# Patient Record
Sex: FEMALE | NOT HISPANIC OR LATINO | URBAN - METROPOLITAN AREA
[De-identification: names, ages, dates, MRNs, and addresses within clinical notes are randomized per-mention and may not be internally consistent; named-entity substitution may affect disease eponyms.]

---

## 2017-08-07 ENCOUNTER — OUTPATIENT (OUTPATIENT)
Dept: OUTPATIENT SERVICES | Facility: HOSPITAL | Age: 72
LOS: 1 days | Discharge: HOME | End: 2017-08-07

## 2017-08-08 DIAGNOSIS — N39.0 URINARY TRACT INFECTION, SITE NOT SPECIFIED: ICD-10-CM

## 2017-10-04 ENCOUNTER — OUTPATIENT (OUTPATIENT)
Dept: OUTPATIENT SERVICES | Facility: HOSPITAL | Age: 72
LOS: 1 days | Discharge: HOME | End: 2017-10-04

## 2017-10-04 DIAGNOSIS — N39.0 URINARY TRACT INFECTION, SITE NOT SPECIFIED: ICD-10-CM

## 2017-10-09 ENCOUNTER — OUTPATIENT (OUTPATIENT)
Dept: OUTPATIENT SERVICES | Facility: HOSPITAL | Age: 72
LOS: 1 days | Discharge: HOME | End: 2017-10-09

## 2017-10-09 DIAGNOSIS — K59.09 OTHER CONSTIPATION: ICD-10-CM

## 2017-10-09 DIAGNOSIS — G30.9 ALZHEIMER'S DISEASE, UNSPECIFIED: ICD-10-CM

## 2017-10-09 DIAGNOSIS — N39.0 URINARY TRACT INFECTION, SITE NOT SPECIFIED: ICD-10-CM

## 2017-10-27 ENCOUNTER — OUTPATIENT (OUTPATIENT)
Dept: OUTPATIENT SERVICES | Facility: HOSPITAL | Age: 72
LOS: 1 days | Discharge: HOME | End: 2017-10-27

## 2017-10-27 DIAGNOSIS — G30.9 ALZHEIMER'S DISEASE, UNSPECIFIED: ICD-10-CM

## 2017-10-27 DIAGNOSIS — N39.0 URINARY TRACT INFECTION, SITE NOT SPECIFIED: ICD-10-CM

## 2017-10-27 DIAGNOSIS — Z00.00 ENCOUNTER FOR GENERAL ADULT MEDICAL EXAMINATION WITHOUT ABNORMAL FINDINGS: ICD-10-CM

## 2017-12-07 ENCOUNTER — OUTPATIENT (OUTPATIENT)
Dept: OUTPATIENT SERVICES | Facility: HOSPITAL | Age: 72
LOS: 1 days | Discharge: HOME | End: 2017-12-07

## 2017-12-07 DIAGNOSIS — N39.0 URINARY TRACT INFECTION, SITE NOT SPECIFIED: ICD-10-CM

## 2018-01-23 ENCOUNTER — OUTPATIENT (OUTPATIENT)
Dept: OUTPATIENT SERVICES | Facility: HOSPITAL | Age: 73
LOS: 1 days | Discharge: HOME | End: 2018-01-23

## 2018-01-23 DIAGNOSIS — Z00.00 ENCOUNTER FOR GENERAL ADULT MEDICAL EXAMINATION WITHOUT ABNORMAL FINDINGS: ICD-10-CM

## 2018-01-23 DIAGNOSIS — G30.9 ALZHEIMER'S DISEASE, UNSPECIFIED: ICD-10-CM

## 2018-01-23 DIAGNOSIS — K59.09 OTHER CONSTIPATION: ICD-10-CM

## 2018-01-23 DIAGNOSIS — N39.0 URINARY TRACT INFECTION, SITE NOT SPECIFIED: ICD-10-CM

## 2018-03-23 ENCOUNTER — INPATIENT (INPATIENT)
Facility: HOSPITAL | Age: 73
LOS: 7 days | Discharge: NURSING, REHAB OR LONG-TERM CARE | End: 2018-03-31
Attending: INTERNAL MEDICINE

## 2018-03-23 VITALS
HEIGHT: 66 IN | RESPIRATION RATE: 17 BRPM | WEIGHT: 134.92 LBS | TEMPERATURE: 97 F | HEART RATE: 78 BPM | SYSTOLIC BLOOD PRESSURE: 125 MMHG | OXYGEN SATURATION: 97 % | DIASTOLIC BLOOD PRESSURE: 94 MMHG

## 2018-03-23 LAB
ALBUMIN SERPL ELPH-MCNC: 4.2 G/DL — SIGNIFICANT CHANGE UP (ref 3.5–5.2)
ALP SERPL-CCNC: 72 U/L — SIGNIFICANT CHANGE UP (ref 30–115)
ALT FLD-CCNC: 16 U/L — SIGNIFICANT CHANGE UP (ref 0–41)
ANION GAP SERPL CALC-SCNC: 17 MMOL/L — HIGH (ref 7–14)
APTT BLD: 32.2 SEC — SIGNIFICANT CHANGE UP (ref 27–39.2)
AST SERPL-CCNC: 19 U/L — SIGNIFICANT CHANGE UP (ref 0–41)
BASOPHILS # BLD AUTO: 0.03 K/UL — SIGNIFICANT CHANGE UP (ref 0–0.2)
BASOPHILS NFR BLD AUTO: 0.2 % — SIGNIFICANT CHANGE UP (ref 0–1)
BILIRUB SERPL-MCNC: 0.5 MG/DL — SIGNIFICANT CHANGE UP (ref 0.2–1.2)
BUN SERPL-MCNC: 13 MG/DL — SIGNIFICANT CHANGE UP (ref 10–20)
CALCIUM SERPL-MCNC: 9.4 MG/DL — SIGNIFICANT CHANGE UP (ref 8.5–10.1)
CHLORIDE SERPL-SCNC: 95 MMOL/L — LOW (ref 98–110)
CO2 SERPL-SCNC: 28 MMOL/L — SIGNIFICANT CHANGE UP (ref 17–32)
CREAT SERPL-MCNC: 0.7 MG/DL — SIGNIFICANT CHANGE UP (ref 0.7–1.5)
EOSINOPHIL # BLD AUTO: 0.01 K/UL — SIGNIFICANT CHANGE UP (ref 0–0.7)
EOSINOPHIL NFR BLD AUTO: 0.1 % — SIGNIFICANT CHANGE UP (ref 0–8)
GLUCOSE SERPL-MCNC: 134 MG/DL — HIGH (ref 70–99)
HCT VFR BLD CALC: 44.2 % — SIGNIFICANT CHANGE UP (ref 37–47)
HGB BLD-MCNC: 14.7 G/DL — SIGNIFICANT CHANGE UP (ref 12–16)
IMM GRANULOCYTES NFR BLD AUTO: 0.6 % — HIGH (ref 0.1–0.3)
INR BLD: 1.08 RATIO — SIGNIFICANT CHANGE UP (ref 0.65–1.3)
LYMPHOCYTES # BLD AUTO: 0.7 K/UL — LOW (ref 1.2–3.4)
LYMPHOCYTES # BLD AUTO: 3.5 % — LOW (ref 20.5–51.1)
MCHC RBC-ENTMCNC: 31.3 PG — HIGH (ref 27–31)
MCHC RBC-ENTMCNC: 33.3 G/DL — SIGNIFICANT CHANGE UP (ref 32–37)
MCV RBC AUTO: 94 FL — SIGNIFICANT CHANGE UP (ref 81–99)
MONOCYTES # BLD AUTO: 1.07 K/UL — HIGH (ref 0.1–0.6)
MONOCYTES NFR BLD AUTO: 5.4 % — SIGNIFICANT CHANGE UP (ref 1.7–9.3)
NEUTROPHILS # BLD AUTO: 18.03 K/UL — HIGH (ref 1.4–6.5)
NEUTROPHILS NFR BLD AUTO: 90.2 % — HIGH (ref 42.2–75.2)
NRBC # BLD: 0 /100 WBCS — SIGNIFICANT CHANGE UP (ref 0–0)
PLATELET # BLD AUTO: 266 K/UL — SIGNIFICANT CHANGE UP (ref 130–400)
POTASSIUM SERPL-MCNC: 3.9 MMOL/L — SIGNIFICANT CHANGE UP (ref 3.5–5)
POTASSIUM SERPL-SCNC: 3.9 MMOL/L — SIGNIFICANT CHANGE UP (ref 3.5–5)
PROT SERPL-MCNC: 7.2 G/DL — SIGNIFICANT CHANGE UP (ref 6–8)
PROTHROM AB SERPL-ACNC: 11.7 SEC — SIGNIFICANT CHANGE UP (ref 9.95–12.87)
RBC # BLD: 4.7 M/UL — SIGNIFICANT CHANGE UP (ref 4.2–5.4)
RBC # FLD: 12.1 % — SIGNIFICANT CHANGE UP (ref 11.5–14.5)
SODIUM SERPL-SCNC: 140 MMOL/L — SIGNIFICANT CHANGE UP (ref 135–146)
WBC # BLD: 19.95 K/UL — HIGH (ref 4.8–10.8)
WBC # FLD AUTO: 19.95 K/UL — HIGH (ref 4.8–10.8)

## 2018-03-23 RX ORDER — DIVALPROEX SODIUM 500 MG/1
250 TABLET, DELAYED RELEASE ORAL
Qty: 0 | Refills: 0 | Status: DISCONTINUED | OUTPATIENT
Start: 2018-03-23 | End: 2018-03-31

## 2018-03-23 RX ORDER — RIVASTIGMINE 4.6 MG/24H
1 PATCH, EXTENDED RELEASE TRANSDERMAL EVERY 24 HOURS
Qty: 0 | Refills: 0 | Status: DISCONTINUED | OUTPATIENT
Start: 2018-03-23 | End: 2018-03-31

## 2018-03-23 RX ORDER — MORPHINE SULFATE 50 MG/1
2 CAPSULE, EXTENDED RELEASE ORAL
Qty: 0 | Refills: 0 | Status: DISCONTINUED | OUTPATIENT
Start: 2018-03-23 | End: 2018-03-23

## 2018-03-23 RX ORDER — MORPHINE SULFATE 50 MG/1
2 CAPSULE, EXTENDED RELEASE ORAL
Qty: 0 | Refills: 0 | Status: DISCONTINUED | OUTPATIENT
Start: 2018-03-23 | End: 2018-03-28

## 2018-03-23 RX ORDER — CLONAZEPAM 1 MG
0 TABLET ORAL
Qty: 0 | Refills: 0 | COMMUNITY

## 2018-03-23 RX ORDER — HEPARIN SODIUM 5000 [USP'U]/ML
5000 INJECTION INTRAVENOUS; SUBCUTANEOUS EVERY 8 HOURS
Qty: 0 | Refills: 0 | Status: DISCONTINUED | OUTPATIENT
Start: 2018-03-23 | End: 2018-03-28

## 2018-03-23 RX ORDER — SODIUM CHLORIDE 9 MG/ML
3 INJECTION INTRAMUSCULAR; INTRAVENOUS; SUBCUTANEOUS EVERY 8 HOURS
Qty: 0 | Refills: 0 | Status: DISCONTINUED | OUTPATIENT
Start: 2018-03-23 | End: 2018-03-31

## 2018-03-23 RX ORDER — CLONAZEPAM 1 MG
0.25 TABLET ORAL DAILY
Qty: 0 | Refills: 0 | Status: DISCONTINUED | OUTPATIENT
Start: 2018-03-23 | End: 2018-03-23

## 2018-03-23 RX ORDER — SENNA PLUS 8.6 MG/1
2 TABLET ORAL AT BEDTIME
Qty: 0 | Refills: 0 | Status: DISCONTINUED | OUTPATIENT
Start: 2018-03-23 | End: 2018-03-31

## 2018-03-23 RX ORDER — DOCUSATE SODIUM 100 MG
100 CAPSULE ORAL THREE TIMES A DAY
Qty: 0 | Refills: 0 | Status: DISCONTINUED | OUTPATIENT
Start: 2018-03-23 | End: 2018-03-31

## 2018-03-23 RX ORDER — ACETAMINOPHEN WITH CODEINE 300MG-30MG
1 TABLET ORAL EVERY 4 HOURS
Qty: 0 | Refills: 0 | Status: DISCONTINUED | OUTPATIENT
Start: 2018-03-23 | End: 2018-03-23

## 2018-03-23 RX ORDER — MORPHINE SULFATE 50 MG/1
4 CAPSULE, EXTENDED RELEASE ORAL ONCE
Qty: 0 | Refills: 0 | Status: DISCONTINUED | OUTPATIENT
Start: 2018-03-23 | End: 2018-03-23

## 2018-03-23 RX ADMIN — Medication 1 TABLET(S): at 18:58

## 2018-03-23 RX ADMIN — HEPARIN SODIUM 5000 UNIT(S): 5000 INJECTION INTRAVENOUS; SUBCUTANEOUS at 23:13

## 2018-03-23 RX ADMIN — MORPHINE SULFATE 2 MILLIGRAM(S): 50 CAPSULE, EXTENDED RELEASE ORAL at 20:57

## 2018-03-23 RX ADMIN — MORPHINE SULFATE 2 MILLIGRAM(S): 50 CAPSULE, EXTENDED RELEASE ORAL at 20:53

## 2018-03-23 RX ADMIN — DIVALPROEX SODIUM 250 MILLIGRAM(S): 500 TABLET, DELAYED RELEASE ORAL at 23:15

## 2018-03-23 RX ADMIN — SODIUM CHLORIDE 3 MILLILITER(S): 9 INJECTION INTRAMUSCULAR; INTRAVENOUS; SUBCUTANEOUS at 20:57

## 2018-03-23 NOTE — H&P ADULT - ASSESSMENT
73y F,  at baseline ambulatory with assistance and nonverbal due to alzheimers dementia, w pmh listed below presented s/p mechanical fall onto knees at home while trying to get up from couch,  without any head trauma or LOC. Found to have L femoral neck deformity and exhibiting pain response to being moved/adjusted.        S/P MECH FALL   + L FEMORAL NECK DEFORMITY  --  pain control: morphine iv prn (based on pain responses / facial clues to pt being in pain);  yancey insertion to minimize discomfort for cleaning / turning patient  --  dvt ppx, fall risk precautions  --  activity: bedrest / advance as tolerated  --  ortho eval  --  ptrehab eval after activity status advanced      ALZHEIMERS DEMENTIA / PARKINSONS  --  cw home regimen    DVT PPX

## 2018-03-23 NOTE — H&P ADULT - ATTENDING COMMENTS
73y female with  PMH of Alzheimer dementia, Cb's diease was brought to ED after mechanical fall, patient is non-verbal, medical hx provided by family. She lives with home aid, she is able to ambulate at home with assistance, she was trying to get up from cough when she slipped and fell down on her knees, she didn't have LOC, she was brought to ED and skeletal images showed left femoral neck displaced fracture.     Physical exam:   Patient is alert, non-verbal, no distress  HEENT: dry oral mucosa.   Neck: no JVD, no carotid bruit.   Chest; clear, no rales or wheezing  Heart: RRR s1 s2, no murmurs  Abdomen: soft, non-tender, no megalies.   Ext: left LLE shortened and exterior rotation, pulses are full b/l.     A/P:   Left Femoral neck fracture:   Orthopedic consulted, plan for surgical intervention.   Pain control, NPO, IV NS.       parkinon's disease and Alzheimer dementia:   continue Home medications.     DVT PPX: Heparin sc. 73y female with  PMH of Alzheimer dementia, Parkinson's diease was brought to ED after mechanical fall, patient is non-verbal, medical hx provided by family. She lives with home aid, she is able to ambulate at home with assistance, she was trying to get up from cough when she slipped and fell down on her knees, she didn't have LOC, she was brought to ED and skeletal images showed left femoral neck displaced fracture.     Physical exam:   Patient is alert, non-verbal, no distress  HEENT: dry oral mucosa.   Neck: no JVD, no carotid bruit.   Chest; clear, no rales or wheezing  Heart: RRR s1 s2, no murmurs  Abdomen: soft, non-tender, no megalies.   Ext: left LLE shortened and exterior rotation, pulses are full b/l.     A/P:   Left Femoral neck fracture:   Orthopedic consulted, plan for surgical intervention.   Pain control, NPO, IV NS.       Parkinson's disease and Alzheimer dementia:   continue Home medications.     DVT PPX: Heparin sc.    Medical Clearance:   Patient is non-verbal, can't asses chest pain, she doesn't look in acute respiratory distress.   No Cardiac disease, no CAD, CHF or cardiac arrhythmia  No hx of CVA or CKD.   Physical exam: normal cardiovascular exam.  EKG: NSR, normal Axis, NST changes, no acute ischemic changes.     Assessment:   patient represent low cardiac risk for planned procedure.   Plan:   Proceed with surgery.

## 2018-03-23 NOTE — H&P ADULT - HISTORY OF PRESENT ILLNESS
73y F,  at baseline ambulatory with assistance, w pmh listed below presented s/p mechanical fall at home.       Pt lives at home with aid.  Per aid,  pt fell onto her knees after trying to get up from a seated position on the couch.  Pt did not have any trauma to her head, loss of consciousness.  Family states that patient does often tend to get up and sit down often from being restless on her parkinson medications, but this is the only fall she's had over past year.     Unable to ask if patient felt any lightheadedness / weakness / palpitations prior to fall because patient is nonverbal at baseline due to underlying dementia.

## 2018-03-23 NOTE — CONSULT NOTE ADULT - ASSESSMENT
A/P  73F with left femoral neck fracture:  - NPO/IVF at midnight for OR  - Patient's  consented for suzi vs. total hip arthroplasty  - NWB LLE  - Pain control  - DVT PPX  - IS  - Preop Transfusion PRN to H/H 10.0 / 30.0  - Type and Screen, 2 U on hold for OR  - Medical clearance needed prior to OR  - Medical management per primary team  - Ortho to continue following A/P  73F with left femoral neck fracture:  - NPO/IVF at midnight for OR  - Patient's  consented for suzi vs. total hip arthroplasty  - NWB LLE  - Pain control  - DVT PPX  - IS  - Preop Transfusion PRN to H/H 10.0 / 30.0  - Type and Screen, 2 U on hold for OR  - Medical clearance needed prior to OR  - Medical management per primary team  - Ortho to continue following   patient seen and examined   discussed treatment with family   will get risk assessment completed   anticipate suzi sunday  agree with above

## 2018-03-23 NOTE — ED PROVIDER NOTE - ATTENDING CONTRIBUTION TO CARE
73 y.o. female, PMH of Alzheimer's and parkinson's, comes in for evaluation after she fell and landed on her knees. Since the fall she appears to be uncomfortable every time someone moves her left leg. Pt did not hit head, no LOC. Pt is non-verbal, unable to provide any history. On exam, pt is awake and nonverbal, NAD, head NC/AT, lungs CTA B/L, CV S1S2 regular, abdomen soft/NT/ND/(+)BS, ext Pt grimacing on palpation of left hip, no deformity, pulses intact, good cap refill. XR reviewed, (+) for fx. WIll  admit and transfer to [facility] for ortho eval.

## 2018-03-24 LAB
ALBUMIN SERPL ELPH-MCNC: 4 G/DL — SIGNIFICANT CHANGE UP (ref 3.5–5.2)
ALP SERPL-CCNC: 66 U/L — SIGNIFICANT CHANGE UP (ref 30–115)
ALT FLD-CCNC: 13 U/L — SIGNIFICANT CHANGE UP (ref 0–41)
ANION GAP SERPL CALC-SCNC: 13 MMOL/L — SIGNIFICANT CHANGE UP (ref 7–14)
APPEARANCE UR: CLEAR — SIGNIFICANT CHANGE UP
AST SERPL-CCNC: 17 U/L — SIGNIFICANT CHANGE UP (ref 0–41)
BASOPHILS # BLD AUTO: 0.03 K/UL — SIGNIFICANT CHANGE UP (ref 0–0.2)
BASOPHILS NFR BLD AUTO: 0.3 % — SIGNIFICANT CHANGE UP (ref 0–1)
BILIRUB SERPL-MCNC: 0.7 MG/DL — SIGNIFICANT CHANGE UP (ref 0.2–1.2)
BILIRUB UR-MCNC: NEGATIVE — SIGNIFICANT CHANGE UP
BUN SERPL-MCNC: 14 MG/DL — SIGNIFICANT CHANGE UP (ref 10–20)
CALCIUM SERPL-MCNC: 9.1 MG/DL — SIGNIFICANT CHANGE UP (ref 8.5–10.1)
CHLORIDE SERPL-SCNC: 99 MMOL/L — SIGNIFICANT CHANGE UP (ref 98–110)
CO2 SERPL-SCNC: 28 MMOL/L — SIGNIFICANT CHANGE UP (ref 17–32)
COLOR SPEC: YELLOW — SIGNIFICANT CHANGE UP
CREAT SERPL-MCNC: 0.7 MG/DL — SIGNIFICANT CHANGE UP (ref 0.7–1.5)
DIFF PNL FLD: NEGATIVE — SIGNIFICANT CHANGE UP
EOSINOPHIL # BLD AUTO: 0.09 K/UL — SIGNIFICANT CHANGE UP (ref 0–0.7)
EOSINOPHIL NFR BLD AUTO: 0.9 % — SIGNIFICANT CHANGE UP (ref 0–8)
GLUCOSE SERPL-MCNC: 102 MG/DL — HIGH (ref 70–99)
GLUCOSE UR QL: NEGATIVE — SIGNIFICANT CHANGE UP
HCT VFR BLD CALC: 42.4 % — SIGNIFICANT CHANGE UP (ref 37–47)
HGB BLD-MCNC: 14 G/DL — SIGNIFICANT CHANGE UP (ref 12–16)
IMM GRANULOCYTES NFR BLD AUTO: 0.4 % — HIGH (ref 0.1–0.3)
KETONES UR-MCNC: NEGATIVE — SIGNIFICANT CHANGE UP
LEUKOCYTE ESTERASE UR-ACNC: NEGATIVE — SIGNIFICANT CHANGE UP
LYMPHOCYTES # BLD AUTO: 1.29 K/UL — SIGNIFICANT CHANGE UP (ref 1.2–3.4)
LYMPHOCYTES # BLD AUTO: 12.5 % — LOW (ref 20.5–51.1)
MAGNESIUM SERPL-MCNC: 2.2 MG/DL — SIGNIFICANT CHANGE UP (ref 1.8–2.4)
MCHC RBC-ENTMCNC: 30.8 PG — SIGNIFICANT CHANGE UP (ref 27–31)
MCHC RBC-ENTMCNC: 33 G/DL — SIGNIFICANT CHANGE UP (ref 32–37)
MCV RBC AUTO: 93.2 FL — SIGNIFICANT CHANGE UP (ref 81–99)
MONOCYTES # BLD AUTO: 0.76 K/UL — HIGH (ref 0.1–0.6)
MONOCYTES NFR BLD AUTO: 7.4 % — SIGNIFICANT CHANGE UP (ref 1.7–9.3)
NEUTROPHILS # BLD AUTO: 8.12 K/UL — HIGH (ref 1.4–6.5)
NEUTROPHILS NFR BLD AUTO: 78.5 % — HIGH (ref 42.2–75.2)
NITRITE UR-MCNC: NEGATIVE — SIGNIFICANT CHANGE UP
PH UR: 7 — SIGNIFICANT CHANGE UP (ref 5–8)
PLATELET # BLD AUTO: 209 K/UL — SIGNIFICANT CHANGE UP (ref 130–400)
POTASSIUM SERPL-MCNC: 4.3 MMOL/L — SIGNIFICANT CHANGE UP (ref 3.5–5)
POTASSIUM SERPL-SCNC: 4.3 MMOL/L — SIGNIFICANT CHANGE UP (ref 3.5–5)
PROT SERPL-MCNC: 6.9 G/DL — SIGNIFICANT CHANGE UP (ref 6–8)
PROT UR-MCNC: 30
RBC # BLD: 4.55 M/UL — SIGNIFICANT CHANGE UP (ref 4.2–5.4)
RBC # FLD: 12.7 % — SIGNIFICANT CHANGE UP (ref 11.5–14.5)
SODIUM SERPL-SCNC: 140 MMOL/L — SIGNIFICANT CHANGE UP (ref 135–146)
SP GR SPEC: 1.03 — SIGNIFICANT CHANGE UP (ref 1.01–1.03)
TYPE + AB SCN PNL BLD: SIGNIFICANT CHANGE UP
TYPE + AB SCN PNL BLD: SIGNIFICANT CHANGE UP
UROBILINOGEN FLD QL: 0.2 — SIGNIFICANT CHANGE UP (ref 0.2–0.2)
WBC # BLD: 10.33 K/UL — SIGNIFICANT CHANGE UP (ref 4.8–10.8)
WBC # FLD AUTO: 10.33 K/UL — SIGNIFICANT CHANGE UP (ref 4.8–10.8)

## 2018-03-24 RX ORDER — SODIUM CHLORIDE 9 MG/ML
1000 INJECTION INTRAMUSCULAR; INTRAVENOUS; SUBCUTANEOUS
Qty: 0 | Refills: 0 | Status: DISCONTINUED | OUTPATIENT
Start: 2018-03-24 | End: 2018-03-27

## 2018-03-24 RX ADMIN — MORPHINE SULFATE 2 MILLIGRAM(S): 50 CAPSULE, EXTENDED RELEASE ORAL at 15:32

## 2018-03-24 RX ADMIN — MORPHINE SULFATE 2 MILLIGRAM(S): 50 CAPSULE, EXTENDED RELEASE ORAL at 16:31

## 2018-03-24 RX ADMIN — MORPHINE SULFATE 2 MILLIGRAM(S): 50 CAPSULE, EXTENDED RELEASE ORAL at 00:13

## 2018-03-24 RX ADMIN — HEPARIN SODIUM 5000 UNIT(S): 5000 INJECTION INTRAVENOUS; SUBCUTANEOUS at 13:06

## 2018-03-24 RX ADMIN — HEPARIN SODIUM 5000 UNIT(S): 5000 INJECTION INTRAVENOUS; SUBCUTANEOUS at 21:13

## 2018-03-24 RX ADMIN — MORPHINE SULFATE 2 MILLIGRAM(S): 50 CAPSULE, EXTENDED RELEASE ORAL at 21:01

## 2018-03-24 RX ADMIN — MORPHINE SULFATE 2 MILLIGRAM(S): 50 CAPSULE, EXTENDED RELEASE ORAL at 05:45

## 2018-03-24 RX ADMIN — SODIUM CHLORIDE 3 MILLILITER(S): 9 INJECTION INTRAMUSCULAR; INTRAVENOUS; SUBCUTANEOUS at 21:03

## 2018-03-24 RX ADMIN — SODIUM CHLORIDE 3 MILLILITER(S): 9 INJECTION INTRAMUSCULAR; INTRAVENOUS; SUBCUTANEOUS at 05:42

## 2018-03-24 RX ADMIN — SODIUM CHLORIDE 3 MILLILITER(S): 9 INJECTION INTRAMUSCULAR; INTRAVENOUS; SUBCUTANEOUS at 13:04

## 2018-03-24 RX ADMIN — MORPHINE SULFATE 2 MILLIGRAM(S): 50 CAPSULE, EXTENDED RELEASE ORAL at 21:30

## 2018-03-24 RX ADMIN — DIVALPROEX SODIUM 250 MILLIGRAM(S): 500 TABLET, DELAYED RELEASE ORAL at 05:39

## 2018-03-24 RX ADMIN — HEPARIN SODIUM 5000 UNIT(S): 5000 INJECTION INTRAVENOUS; SUBCUTANEOUS at 05:39

## 2018-03-24 RX ADMIN — MORPHINE SULFATE 2 MILLIGRAM(S): 50 CAPSULE, EXTENDED RELEASE ORAL at 02:46

## 2018-03-24 RX ADMIN — SODIUM CHLORIDE 50 MILLILITER(S): 9 INJECTION INTRAMUSCULAR; INTRAVENOUS; SUBCUTANEOUS at 18:34

## 2018-03-24 RX ADMIN — DIVALPROEX SODIUM 250 MILLIGRAM(S): 500 TABLET, DELAYED RELEASE ORAL at 17:52

## 2018-03-24 RX ADMIN — RIVASTIGMINE 1 PATCH: 4.6 PATCH, EXTENDED RELEASE TRANSDERMAL at 05:40

## 2018-03-24 NOTE — PROGRESS NOTE ADULT - ASSESSMENT
73F with left femoral neck fracture:  - NPO/IVF at OR  - Awaiting medical clearance  - Please obtain ACTIVE TYPE AND SCREEN, 2U on hold for OR  - NWB LLE  - Pain control  - DVT PPX  - IS  - Medical management per primary team  - Ortho to continue following

## 2018-03-24 NOTE — PROGRESS NOTE ADULT - SUBJECTIVE AND OBJECTIVE BOX
Patient seen and examined during AM rounds. Resting comfortably. No acute events overnight.     PE :  Left LE  Thigh and calf soft and compressible  Non complaint with motor and sensory exam  Spontaneously moving lower extremity  Foot warm and perfused    H/H: 14.0/42.4

## 2018-03-24 NOTE — SWALLOW BEDSIDE ASSESSMENT ADULT - COMMENTS
Pt.'s case discussed with RN. Pt. currently NPO for OR. Therefore, pt. not appropriate for PO trials.

## 2018-03-25 LAB
HCT VFR BLD CALC: 38.1 % — SIGNIFICANT CHANGE UP (ref 37–47)
HGB BLD-MCNC: 12.8 G/DL — SIGNIFICANT CHANGE UP (ref 12–16)
MCHC RBC-ENTMCNC: 31.4 PG — HIGH (ref 27–31)
MCHC RBC-ENTMCNC: 33.6 G/DL — SIGNIFICANT CHANGE UP (ref 32–37)
MCV RBC AUTO: 93.6 FL — SIGNIFICANT CHANGE UP (ref 81–99)
NRBC # BLD: 0 /100 WBCS — SIGNIFICANT CHANGE UP (ref 0–0)
PLATELET # BLD AUTO: 183 K/UL — SIGNIFICANT CHANGE UP (ref 130–400)
RBC # BLD: 4.07 M/UL — LOW (ref 4.2–5.4)
RBC # FLD: 12.8 % — SIGNIFICANT CHANGE UP (ref 11.5–14.5)
WBC # BLD: 9.92 K/UL — SIGNIFICANT CHANGE UP (ref 4.8–10.8)
WBC # FLD AUTO: 9.92 K/UL — SIGNIFICANT CHANGE UP (ref 4.8–10.8)

## 2018-03-25 RX ORDER — DEXAMETHASONE 0.5 MG/5ML
8 ELIXIR ORAL ONCE
Qty: 0 | Refills: 0 | Status: DISCONTINUED | OUTPATIENT
Start: 2018-03-25 | End: 2018-03-25

## 2018-03-25 RX ORDER — METOCLOPRAMIDE HCL 10 MG
10 TABLET ORAL ONCE
Qty: 0 | Refills: 0 | Status: DISCONTINUED | OUTPATIENT
Start: 2018-03-25 | End: 2018-03-25

## 2018-03-25 RX ORDER — MORPHINE SULFATE 50 MG/1
2 CAPSULE, EXTENDED RELEASE ORAL
Qty: 0 | Refills: 0 | Status: DISCONTINUED | OUTPATIENT
Start: 2018-03-25 | End: 2018-03-25

## 2018-03-25 RX ORDER — SODIUM CHLORIDE 9 MG/ML
1000 INJECTION, SOLUTION INTRAVENOUS
Qty: 0 | Refills: 0 | Status: DISCONTINUED | OUTPATIENT
Start: 2018-03-25 | End: 2018-03-25

## 2018-03-25 RX ORDER — MEPERIDINE HYDROCHLORIDE 50 MG/ML
12.5 INJECTION INTRAMUSCULAR; INTRAVENOUS; SUBCUTANEOUS
Qty: 0 | Refills: 0 | Status: DISCONTINUED | OUTPATIENT
Start: 2018-03-25 | End: 2018-03-25

## 2018-03-25 RX ORDER — ONDANSETRON 8 MG/1
4 TABLET, FILM COATED ORAL ONCE
Qty: 0 | Refills: 0 | Status: DISCONTINUED | OUTPATIENT
Start: 2018-03-25 | End: 2018-03-25

## 2018-03-25 RX ORDER — MORPHINE SULFATE 50 MG/1
4 CAPSULE, EXTENDED RELEASE ORAL
Qty: 0 | Refills: 0 | Status: DISCONTINUED | OUTPATIENT
Start: 2018-03-25 | End: 2018-03-25

## 2018-03-25 RX ORDER — CEFAZOLIN SODIUM 1 G
1000 VIAL (EA) INJECTION EVERY 8 HOURS
Qty: 0 | Refills: 0 | Status: COMPLETED | OUTPATIENT
Start: 2018-03-25 | End: 2018-03-26

## 2018-03-25 RX ADMIN — DIVALPROEX SODIUM 250 MILLIGRAM(S): 500 TABLET, DELAYED RELEASE ORAL at 21:46

## 2018-03-25 RX ADMIN — SODIUM CHLORIDE 100 MILLILITER(S): 9 INJECTION, SOLUTION INTRAVENOUS at 18:08

## 2018-03-25 RX ADMIN — Medication 100 MILLIGRAM(S): at 22:43

## 2018-03-25 RX ADMIN — SODIUM CHLORIDE 3 MILLILITER(S): 9 INJECTION INTRAMUSCULAR; INTRAVENOUS; SUBCUTANEOUS at 06:51

## 2018-03-25 RX ADMIN — HEPARIN SODIUM 5000 UNIT(S): 5000 INJECTION INTRAVENOUS; SUBCUTANEOUS at 21:47

## 2018-03-25 RX ADMIN — SODIUM CHLORIDE 3 MILLILITER(S): 9 INJECTION INTRAMUSCULAR; INTRAVENOUS; SUBCUTANEOUS at 22:12

## 2018-03-25 RX ADMIN — RIVASTIGMINE 1 PATCH: 4.6 PATCH, EXTENDED RELEASE TRANSDERMAL at 05:23

## 2018-03-25 RX ADMIN — DIVALPROEX SODIUM 250 MILLIGRAM(S): 500 TABLET, DELAYED RELEASE ORAL at 05:15

## 2018-03-25 RX ADMIN — HEPARIN SODIUM 5000 UNIT(S): 5000 INJECTION INTRAVENOUS; SUBCUTANEOUS at 05:23

## 2018-03-25 RX ADMIN — RIVASTIGMINE 1 PATCH: 4.6 PATCH, EXTENDED RELEASE TRANSDERMAL at 05:28

## 2018-03-25 NOTE — PROGRESS NOTE ADULT - SUBJECTIVE AND OBJECTIVE BOX
Patient seen and examined during AM rounds. Resting comfortably. No acute events overnight. Cleared for surgery today.    Vital Signs Last 24 Hrs  T(C): 37.6 (25 Mar 2018 07:30), Max: 37.6 (25 Mar 2018 00:26)  T(F): 99.6 (25 Mar 2018 07:30), Max: 99.7 (25 Mar 2018 00:26)  HR: 71 (25 Mar 2018 07:30) (63 - 75)  BP: 137/67 (25 Mar 2018 07:30) (111/57 - 137/67)  BP(mean): --  RR: 17 (25 Mar 2018 07:30) (16 - 18)  SpO2: --    PE :  Left LE  Thigh and calf soft and compressible  Non complaint with motor and sensory exam  Spontaneously moving lower extremity  Foot warm and perfused                          12.8   9.92  )-----------( 183      ( 25 Mar 2018 06:18 )             38.1

## 2018-03-25 NOTE — PROGRESS NOTE ADULT - SUBJECTIVE AND OBJECTIVE BOX
ORTHO POC    Patient seen and examined after returning to the floor. Resting comfortably. No acute events.     PE :  LLE  Dressings c/d/i  Thigh and calf soft and compressible  Unable to obtain motor and sensory exam secondary to patient's baseline dementia and non verbal status  Foot warm and perfused    A/P  73F s/p left suzi arthroplasty:  - Pain control  - DVT PPX   - IS  - Dressing care per ortho team  - WBAT LLE  - No hip precautions  - PT, OOBTC  - Dispo: pending  - Medical management per primary team  - Ortho to continue following

## 2018-03-25 NOTE — PRE-ANESTHESIA EVALUATION ADULT - MALLAMPATI CLASS
unable to fully assess due to patient's dementia/Class III - visualization of the soft palate and the base of the uvula

## 2018-03-25 NOTE — PROGRESS NOTE ADULT - SUBJECTIVE AND OBJECTIVE BOX
LUIS MANUELELSY  73y  Female      Patient is a 73y old  Female who presents with a chief complaint of sp fall (23 Mar 2018 20:52)      INTERVAL HPI/OVERNIGHT EVENTS:    Vital Signs Last 24 Hrs  T(C): 37.6 (25 Mar 2018 07:30), Max: 37.6 (25 Mar 2018 00:26)  T(F): 99.6 (25 Mar 2018 07:30), Max: 99.7 (25 Mar 2018 00:26)  HR: 71 (25 Mar 2018 07:30) (63 - 75)  BP: 137/67 (25 Mar 2018 07:30) (111/57 - 137/67)  BP(mean): --  RR: 17 (25 Mar 2018 07:30) (16 - 18)  SpO2: --      18 @ 07:01  -  18 @ 07:00  --------------------------------------------------------  IN: 120 mL / OUT: 100 mL / NET: 20 mL            Consultant(s) Notes Reviewed:  [x ] YES  [ ] NO          MEDICATIONS  (STANDING):  clonazepam 0.125 mg ODT 1 Tablet(s) 1 Tablet(s) Oral daily  dextromethorphan 20 mG/quiNIDine sulfate 10 mG 1 Capsule(s) Oral every 12 hours  diVALproex Sprinkle 250 milliGRAM(s) Oral two times a day  heparin  Injectable 5000 Unit(s) SubCutaneous every 8 hours  rivastigmine patch 13.3 mG/24 Hr(s) 1 Patch Transdermal every 24 hours  sodium chloride 0.9% lock flush 3 milliLiter(s) IV Push every 8 hours  sodium chloride 0.9%. 1000 milliLiter(s) (50 mL/Hr) IV Continuous <Continuous>    MEDICATIONS  (PRN):  acetaminophen 300 mG/codeine 30 mG 1 Tablet(s) Oral every 4 hours PRN Moderate Pain (4 - 6)  docusate sodium 100 milliGRAM(s) Oral three times a day PRN Constipation  morphine  - Injectable 2 milliGRAM(s) IV Push every 3 hours PRN Moderate Pain (4 - 6)  senna 2 Tablet(s) Oral at bedtime PRN Constipation      LABS                          12.8   9.92  )-----------( 183      ( 25 Mar 2018 06:18 )             38.1         140  |  99  |  14  ----------------------------<  102<H>  4.3   |  28  |  0.7    Ca    9.1      24 Mar 2018 06:09  Mg     2.2         TPro  6.9  /  Alb  4.0  /  TBili  0.7  /  DBili  x   /  AST  17  /  ALT  13  /  AlkPhos  66        Urinalysis Basic - ( 24 Mar 2018 06:41 )    Color: Yellow / Appearance: Clear / S.030 / pH: x  Gluc: x / Ketone: Negative  / Bili: Negative / Urobili: 0.2   Blood: x / Protein: 30 / Nitrite: Negative   Leuk Esterase: Negative / RBC: 2-5 /HPF / WBC x   Sq Epi: x / Non Sq Epi: x / Bacteria: x      PT/INR - ( 23 Mar 2018 17:16 )   PT: 11.70 sec;   INR: 1.08 ratio         PTT - ( 23 Mar 2018 17:16 )  PTT:32.2 sec  Lactate Trend        CAPILLARY BLOOD GLUCOSE            RADIOLOGY & ADDITIONAL TESTS:    Imaging Personally Reviewed:  [ ] YES  [ ] NO    HEALTH ISSUES - PROBLEM Dx:        Physical exam:   Patient is alert, non-verbal, no distress  HEENT: dry oral mucosa.   Neck: no JVD, no carotid bruit.   Chest; clear, no rales or wheezing  Heart: RRR s1 s2, no murmurs  Abdomen: soft, non-tender, no megalies.   Ext: left LLE shortened and exterior rotation, pulses are full b/l.

## 2018-03-25 NOTE — BRIEF OPERATIVE NOTE - PROCEDURE
<<-----Click on this checkbox to enter Procedure Arthroplasty, bipolar, hip  03/25/2018  Left Hip Lázaro-Arthroplasty  Active  [facility]

## 2018-03-25 NOTE — PROGRESS NOTE ADULT - ASSESSMENT
73F with left femoral neck fracture:  - NPO/IVF  - for surgery today pending OR availability; family aware  - NWB LLE  - Pain control  - DVT PPX  - IS  - Medical management per primary team  - Ortho to continue following

## 2018-03-25 NOTE — PROGRESS NOTE ADULT - ASSESSMENT
73y female with  PMH of Alzheimer dementia, Parkinson's diease was admitted for s/p mechanical fall and her left hip x ray showed left femoral neck displaced fracture.    A/P:   Left Femoral neck fracture:   Orthopedic consulted, plan for surgical intervention.   Pain control, NPO, IV NS.     Parkinson's disease and Alzheimer dementia:   continue Home medications.     DVT PPX: Heparin sc 73y female with  PMH of Alzheimer dementia, Parkinson's diease was admitted for s/p mechanical fall and her left hip x ray showed left femoral neck displaced fracture.    A/P:   Left Femoral neck fracture:   Orthopedic follow up, plan for OR today.   NWB of BRANDONE.   Pain control, NPO, IV NS.     Parkinson's disease and Alzheimer dementia:   continue Home medications.     DVT PPX: Heparin sc

## 2018-03-26 LAB
-  AMIKACIN: SIGNIFICANT CHANGE UP
-  AMOXICILLIN/CLAVULANIC ACID: SIGNIFICANT CHANGE UP
-  AMPICILLIN/SULBACTAM: SIGNIFICANT CHANGE UP
-  AMPICILLIN: SIGNIFICANT CHANGE UP
-  AZTREONAM: SIGNIFICANT CHANGE UP
-  CEFAZOLIN: SIGNIFICANT CHANGE UP
-  CEFEPIME: SIGNIFICANT CHANGE UP
-  CEFOXITIN: SIGNIFICANT CHANGE UP
-  CEFTRIAXONE: SIGNIFICANT CHANGE UP
-  CIPROFLOXACIN: SIGNIFICANT CHANGE UP
-  ERTAPENEM: SIGNIFICANT CHANGE UP
-  GENTAMICIN: SIGNIFICANT CHANGE UP
-  IMIPENEM: SIGNIFICANT CHANGE UP
-  LEVOFLOXACIN: SIGNIFICANT CHANGE UP
-  MEROPENEM: SIGNIFICANT CHANGE UP
-  NITROFURANTOIN: SIGNIFICANT CHANGE UP
-  PIPERACILLIN/TAZOBACTAM: SIGNIFICANT CHANGE UP
-  TIGECYCLINE: SIGNIFICANT CHANGE UP
-  TOBRAMYCIN: SIGNIFICANT CHANGE UP
-  TRIMETHOPRIM/SULFAMETHOXAZOLE: SIGNIFICANT CHANGE UP
ANION GAP SERPL CALC-SCNC: 12 MMOL/L — SIGNIFICANT CHANGE UP (ref 7–14)
APPEARANCE UR: (no result)
BILIRUB UR-MCNC: NEGATIVE — SIGNIFICANT CHANGE UP
BUN SERPL-MCNC: 10 MG/DL — SIGNIFICANT CHANGE UP (ref 10–20)
CALCIUM SERPL-MCNC: 8.4 MG/DL — LOW (ref 8.5–10.1)
CHLORIDE SERPL-SCNC: 96 MMOL/L — LOW (ref 98–110)
CO2 SERPL-SCNC: 29 MMOL/L — SIGNIFICANT CHANGE UP (ref 17–32)
COLOR SPEC: YELLOW — SIGNIFICANT CHANGE UP
CREAT SERPL-MCNC: 0.7 MG/DL — SIGNIFICANT CHANGE UP (ref 0.7–1.5)
CULTURE RESULTS: SIGNIFICANT CHANGE UP
DIFF PNL FLD: (no result)
EPI CELLS # UR: (no result) /HPF
GLUCOSE SERPL-MCNC: 123 MG/DL — HIGH (ref 70–99)
GLUCOSE UR QL: NEGATIVE MG/DL — SIGNIFICANT CHANGE UP
HCT VFR BLD CALC: 36.7 % — LOW (ref 37–47)
HGB BLD-MCNC: 12.4 G/DL — SIGNIFICANT CHANGE UP (ref 12–16)
KETONES UR-MCNC: NEGATIVE — SIGNIFICANT CHANGE UP
LEUKOCYTE ESTERASE UR-ACNC: (no result)
MCHC RBC-ENTMCNC: 31.3 PG — HIGH (ref 27–31)
MCHC RBC-ENTMCNC: 33.8 G/DL — SIGNIFICANT CHANGE UP (ref 32–37)
MCV RBC AUTO: 92.7 FL — SIGNIFICANT CHANGE UP (ref 81–99)
METHOD TYPE: SIGNIFICANT CHANGE UP
NITRITE UR-MCNC: NEGATIVE — SIGNIFICANT CHANGE UP
NRBC # BLD: 0 /100 WBCS — SIGNIFICANT CHANGE UP (ref 0–0)
ORGANISM # SPEC MICROSCOPIC CNT: SIGNIFICANT CHANGE UP
ORGANISM # SPEC MICROSCOPIC CNT: SIGNIFICANT CHANGE UP
PH UR: 6.5 — SIGNIFICANT CHANGE UP (ref 5–8)
PLATELET # BLD AUTO: 224 K/UL — SIGNIFICANT CHANGE UP (ref 130–400)
POTASSIUM SERPL-MCNC: 4.2 MMOL/L — SIGNIFICANT CHANGE UP (ref 3.5–5)
POTASSIUM SERPL-SCNC: 4.2 MMOL/L — SIGNIFICANT CHANGE UP (ref 3.5–5)
PROT UR-MCNC: (no result) MG/DL
RBC # BLD: 3.96 M/UL — LOW (ref 4.2–5.4)
RBC # FLD: 12.6 % — SIGNIFICANT CHANGE UP (ref 11.5–14.5)
RBC CASTS # UR COMP ASSIST: (no result) /HPF
SODIUM SERPL-SCNC: 137 MMOL/L — SIGNIFICANT CHANGE UP (ref 135–146)
SP GR SPEC: 1.02 — SIGNIFICANT CHANGE UP (ref 1.01–1.03)
SPECIMEN SOURCE: SIGNIFICANT CHANGE UP
UROBILINOGEN FLD QL: 1 MG/DL (ref 0.2–0.2)
WBC # BLD: 12.43 K/UL — HIGH (ref 4.8–10.8)
WBC # FLD AUTO: 12.43 K/UL — HIGH (ref 4.8–10.8)
WBC UR QL: (no result) /HPF

## 2018-03-26 RX ORDER — ACETAMINOPHEN 500 MG
650 TABLET ORAL EVERY 6 HOURS
Qty: 0 | Refills: 0 | Status: DISCONTINUED | OUTPATIENT
Start: 2018-03-26 | End: 2018-03-31

## 2018-03-26 RX ADMIN — HEPARIN SODIUM 5000 UNIT(S): 5000 INJECTION INTRAVENOUS; SUBCUTANEOUS at 21:42

## 2018-03-26 RX ADMIN — SODIUM CHLORIDE 3 MILLILITER(S): 9 INJECTION INTRAMUSCULAR; INTRAVENOUS; SUBCUTANEOUS at 06:46

## 2018-03-26 RX ADMIN — Medication 650 MILLIGRAM(S): at 05:48

## 2018-03-26 RX ADMIN — DIVALPROEX SODIUM 250 MILLIGRAM(S): 500 TABLET, DELAYED RELEASE ORAL at 17:42

## 2018-03-26 RX ADMIN — Medication 650 MILLIGRAM(S): at 21:41

## 2018-03-26 RX ADMIN — DIVALPROEX SODIUM 250 MILLIGRAM(S): 500 TABLET, DELAYED RELEASE ORAL at 05:24

## 2018-03-26 RX ADMIN — Medication 100 MILLIGRAM(S): at 05:24

## 2018-03-26 RX ADMIN — Medication 100 MILLIGRAM(S): at 17:06

## 2018-03-26 RX ADMIN — RIVASTIGMINE 1 PATCH: 4.6 PATCH, EXTENDED RELEASE TRANSDERMAL at 06:46

## 2018-03-26 RX ADMIN — SODIUM CHLORIDE 3 MILLILITER(S): 9 INJECTION INTRAMUSCULAR; INTRAVENOUS; SUBCUTANEOUS at 21:42

## 2018-03-26 RX ADMIN — SODIUM CHLORIDE 3 MILLILITER(S): 9 INJECTION INTRAMUSCULAR; INTRAVENOUS; SUBCUTANEOUS at 14:06

## 2018-03-26 RX ADMIN — MORPHINE SULFATE 2 MILLIGRAM(S): 50 CAPSULE, EXTENDED RELEASE ORAL at 04:14

## 2018-03-26 RX ADMIN — MORPHINE SULFATE 2 MILLIGRAM(S): 50 CAPSULE, EXTENDED RELEASE ORAL at 04:45

## 2018-03-26 RX ADMIN — HEPARIN SODIUM 5000 UNIT(S): 5000 INJECTION INTRAVENOUS; SUBCUTANEOUS at 05:25

## 2018-03-26 RX ADMIN — RIVASTIGMINE 1 PATCH: 4.6 PATCH, EXTENDED RELEASE TRANSDERMAL at 05:24

## 2018-03-26 RX ADMIN — HEPARIN SODIUM 5000 UNIT(S): 5000 INJECTION INTRAVENOUS; SUBCUTANEOUS at 17:06

## 2018-03-26 NOTE — OCCUPATIONAL THERAPY INITIAL EVALUATION ADULT - LIVES WITH, PROFILE
pt lives in 1 bedroom apt with 1 flight to enter. pt's spouse reports they can move to 1st floor apartment/spouse

## 2018-03-26 NOTE — PHYSICAL THERAPY INITIAL EVALUATION ADULT - LEVEL OF INDEPENDENCE: GAIT, REHAB EVAL
Unsafe to initiate due to poor sitting/standing balance and tolerance with limited participation at this time.

## 2018-03-26 NOTE — SWALLOW BEDSIDE ASSESSMENT ADULT - SWALLOW EVAL: PATIENT/FAMILY GOALS STATEMENT
Pt reportedly eats a regular consistency diet at home, drinks w/ a straw, no reported coughing w/ po

## 2018-03-26 NOTE — PROGRESS NOTE ADULT - SUBJECTIVE AND OBJECTIVE BOX
Patient is a 73y old  Female who presents with a chief complaint of sp fall (23 Mar 2018 20:52)    currently admitted with a diagnosis of left femoral neck acute displaced fracture s/p left suzi-arthroplasty on 2018      PAST MEDICAL & SURGICAL HISTORY:  Glaucoma  Frequent UTI  Parkinson disease  Alzheimer disease  No significant past surgical history      MEDICATIONS  (STANDING):  ceFAZolin   IVPB 1000 milliGRAM(s) IV Intermittent every 8 hours  clonazepam 0.125 mg ODT 1 Tablet(s) 1 Tablet(s) Oral daily  dextromethorphan 20 mG/quiNIDine sulfate 10 mG 1 Capsule(s) Oral every 12 hours  diVALproex Sprinkle 250 milliGRAM(s) Oral two times a day  heparin  Injectable 5000 Unit(s) SubCutaneous every 8 hours  rivastigmine patch 13.3 mG/24 Hr(s) 1 Patch Transdermal every 24 hours  sodium chloride 0.9% lock flush 3 milliLiter(s) IV Push every 8 hours  sodium chloride 0.9%. 1000 milliLiter(s) (50 mL/Hr) IV Continuous <Continuous>    MEDICATIONS  (PRN):  acetaminophen  Suppository 650 milliGRAM(s) Rectal every 6 hours PRN For Temp greater than 38.5 C (101.3 F)  acetaminophen 300 mG/codeine 30 mG 1 Tablet(s) Oral every 4 hours PRN Moderate Pain (4 - 6)  docusate sodium 100 milliGRAM(s) Oral three times a day PRN Constipation  morphine  - Injectable 2 milliGRAM(s) IV Push every 3 hours PRN Moderate Pain (4 - 6)  senna 2 Tablet(s) Oral at bedtime PRN Constipation      Vital Signs Last 24 Hrs  T(C): 37.4 (26 Mar 2018 07:24), Max: 38.2 (26 Mar 2018 04:00)  T(F): 99.3 (26 Mar 2018 07:24), Max: 100.7 (26 Mar 2018 04:00)  HR: 96 (26 Mar 2018 07:24) (68 - 106)  BP: 123/61 (26 Mar 2018 07:24) (121/63 - 195/86)  BP(mean): 86 (25 Mar 2018 16:27) (86 - 86)  RR: 18 (26 Mar 2018 07:24) (12 - 21)  SpO2: 97% (25 Mar 2018 17:42) (94% - 98%)  CAPILLARY BLOOD GLUCOSE        I&O's Summary    25 Mar 2018 07:01  -  26 Mar 2018 07:00  --------------------------------------------------------  IN: 370 mL / OUT: 1525 mL / NET: -1155 mL        Physical Exam:    -     General :     -      HEENT:    -      Cardiac:    -      Pulm:    -      GI:    -      Musculoskeletal:    -      Neuro:        Labs:                        12.4   12.43 )-----------( 224      ( 26 Mar 2018 05:54 )             36.7             03-    137  |  96<L>  |  10  ----------------------------<  123<H>  4.2   |  29  |  0.7    Ca    8.4<L>      26 Mar 2018 05:54                      Urinalysis Basic - ( 26 Mar 2018 08:16 )    Color: Yellow / Appearance: Cloudy / S.025 / pH: x  Gluc: x / Ketone: Negative  / Bili: Negative / Urobili: 1.0 mg/dL   Blood: x / Protein: Trace mg/dL / Nitrite: Negative   Leuk Esterase: Moderate / RBC: 5-10 /HPF / WBC 10-25 /HPF   Sq Epi: x / Non Sq Epi: Few /HPF / Bacteria: x        Culture - Urine (collected 24 Mar 2018 06:40)  Source: .Urine Catheterized  Preliminary Report (25 Mar 2018 19:11):    50,000 - 99,000 CFU/mL Escherichia coli    Normal Urogenital angie present    Culture - Blood (collected 24 Mar 2018 06:09)  Source: .Blood None  Preliminary Report (25 Mar 2018 17:01):    No growth to date.        Imaging:    ECG: Patient is a 73y old  Female who presents with a chief complaint of sp fall (23 Mar 2018 20:52)    currently admitted with a diagnosis of left femoral neck acute displaced fracture s/p left suzi-arthroplasty on 2018      PAST MEDICAL & SURGICAL HISTORY:  Glaucoma  Frequent UTI  Parkinson disease  Alzheimer disease  No significant past surgical history      MEDICATIONS  (STANDING):  ceFAZolin   IVPB 1000 milliGRAM(s) IV Intermittent every 8 hours  clonazepam 0.125 mg ODT 1 Tablet(s) 1 Tablet(s) Oral daily  dextromethorphan 20 mG/quiNIDine sulfate 10 mG 1 Capsule(s) Oral every 12 hours  diVALproex Sprinkle 250 milliGRAM(s) Oral two times a day  heparin  Injectable 5000 Unit(s) SubCutaneous every 8 hours  rivastigmine patch 13.3 mG/24 Hr(s) 1 Patch Transdermal every 24 hours  sodium chloride 0.9% lock flush 3 milliLiter(s) IV Push every 8 hours  sodium chloride 0.9%. 1000 milliLiter(s) (50 mL/Hr) IV Continuous <Continuous>    MEDICATIONS  (PRN):  acetaminophen  Suppository 650 milliGRAM(s) Rectal every 6 hours PRN For Temp greater than 38.5 C (101.3 F)  acetaminophen 300 mG/codeine 30 mG 1 Tablet(s) Oral every 4 hours PRN Moderate Pain (4 - 6)  docusate sodium 100 milliGRAM(s) Oral three times a day PRN Constipation  morphine  - Injectable 2 milliGRAM(s) IV Push every 3 hours PRN Moderate Pain (4 - 6)  senna 2 Tablet(s) Oral at bedtime PRN Constipation      Vital Signs Last 24 Hrs  T(C): 37.4 (26 Mar 2018 07:24), Max: 38.2 (26 Mar 2018 04:00)  T(F): 99.3 (26 Mar 2018 07:24), Max: 100.7 (26 Mar 2018 04:00)  HR: 96 (26 Mar 2018 07:24) (68 - 106)  BP: 123/61 (26 Mar 2018 07:24) (121/63 - 195/86)  BP(mean): 86 (25 Mar 2018 16:27) (86 - 86)  RR: 18 (26 Mar 2018 07:24) (12 - 21)  SpO2: 97% (25 Mar 2018 17:42) (94% - 98%)  CAPILLARY BLOOD GLUCOSE        I&O's Summary    25 Mar 2018 07:01  -  26 Mar 2018 07:00  --------------------------------------------------------  IN: 370 mL / OUT: 1525 mL / NET: -1155 mL        Physical Exam:    Patient is alert, non-verbal, no distress  HEENT: dry oral mucosa.   Neck: no JVD, no carotid bruit.   Chest; clear, no rales or wheezing  Heart: RRR s1 s2, no murmurs  Abdomen: soft, non-tender, no organomegaly   Ext: no edema      Labs:                        12.4   12.43 )-----------( 224      ( 26 Mar 2018 05:54 )             36.7                 137  |  96<L>  |  10  ----------------------------<  123<H>  4.2   |  29  |  0.7    Ca    8.4<L>      26 Mar 2018 05:54                      Urinalysis Basic - ( 26 Mar 2018 08:16 )    Color: Yellow / Appearance: Cloudy / S.025 / pH: x  Gluc: x / Ketone: Negative  / Bili: Negative / Urobili: 1.0 mg/dL   Blood: x / Protein: Trace mg/dL / Nitrite: Negative   Leuk Esterase: Moderate / RBC: 5-10 /HPF / WBC 10-25 /HPF   Sq Epi: x / Non Sq Epi: Few /HPF / Bacteria: x        Culture - Urine (collected 24 Mar 2018 06:40)  Source: .Urine Catheterized  Preliminary Report (25 Mar 2018 19:11):    50,000 - 99,000 CFU/mL Escherichia coli    Normal Urogenital angie present    Culture - Blood (collected 24 Mar 2018 06:09)  Source: .Blood None  Preliminary Report (25 Mar 2018 17:01):    No growth to date.        Imaging:    < from: Xray Chest 1 View- PORTABLE-Urgent (18 @ 07:47) >    Slightly increased bilateral opacity/effusions.    < end of copied text >      < from: Xray Hip 2-3 Views, Left (18 @ 16:21) >    There has been interval left hip hemiarthroplasty in anatomical position   on this one frontal view. There is postoperative subcutaneous emphysema   and soft tissue swelling. No inadvertent radiopaque foreign bodies.    < end of copied text >      ECG:  < from: 12 Lead ECG (18 @ 10:48) >    Ventricular Rate 64 BPM    Atrial Rate 64 BPM    P-R Interval 146 ms    QRS Duration 90 ms    Q-T Interval 404 ms    QTC Calculation(Bezet) 416 ms    P Axis 38 degrees    R Axis 9 degrees    T Axis 32 degrees    Diagnosis Line Normal sinus rhythm  Nonspecific ST and T wave abnormality  Abnormal ECG    < end of copied text >

## 2018-03-26 NOTE — PHYSICAL THERAPY INITIAL EVALUATION ADULT - FOLLOWS COMMANDS/ANSWERS QUESTIONS, REHAB EVAL
Brief periods of pt participation during mobility although very limited at this time./unable to follow commands

## 2018-03-26 NOTE — PHYSICAL THERAPY INITIAL EVALUATION ADULT - LEVEL OF INDEPENDENCE: SIT/STAND, REHAB EVAL
dependent (less than 25% patients effort)/2 trials from elevated EOB.Although pt ultimately with poor participation, pt did briefly attempt to stabilize trunk and b/l LE via extension during both trials for ~2-3 before once again demonstrating full postural collapse.

## 2018-03-26 NOTE — OCCUPATIONAL THERAPY INITIAL EVALUATION ADULT - RANGE OF MOTION EXAMINATION, UPPER EXTREMITY
Pt unable to follow commands to formally assess arom. therapist witness movement of distal bue during evaluation. pt with prom wfl.

## 2018-03-26 NOTE — CONSULT NOTE ADULT - ASSESSMENT
IMPRESSION: Rehab of LEFT HIP FX, S/P HEMIARTHROPLASTY    PRECAUTIONS: [  ] Cardiac  [  ] Respiratory  [  ] Seizures [  ] Contact Isolation  [  ] Droplet Isolation  [  ] Other    Weight Bearing Status:  WBAT    RECOMMENDATION:    Out of Bed to Chair     DVT/Decubiti Prophylaxis    REHAB PLAN:     [ X  ] Bedside P/T 3-5 times a week   [ X  ]   Bedside O/T  2-3 times a week             [   ] No Rehab Therapy Indicated                   [   ]  Speech Therapy   Conditioning/ROM                                    ADL  Bed Mobility                                               Conditioning/ROM  Transfers                                                     Bed Mobility  Sitting /Standing Balance                         Transfers                                        Gait Training                                               Sitting/Standing Balance  Stair Training [   ]Applicable                    Home equipment Eval                                                                        Splinting  [   ] Only      GOALS:   ADL   [   ]   Independent                    Transfers  [   ] Independent                          Ambulation  [   ] Independent     [ X   ] With device                            [   ]  CG                                                         [   ]  CG                                                                  [   ] CG                            [ X   ] Min A                                                   [ X  ] Min A                                                              [ X  ] Min  A          DISCHARGE PLAN:   [   ]  Good candidate for Intensive Rehabilitation/Hospital based-[facility]A [facility]                                             Will tolerate 3hrs Intensive Rehab Daily                                       [ X   ]  Short Term Rehab in Skilled Nursing Facility                                       [    ]  Home with Outpatient or  services                                         [    ]  Possible Candidate for Intensive Hospital based Rehab

## 2018-03-26 NOTE — PROGRESS NOTE ADULT - SUBJECTIVE AND OBJECTIVE BOX
Patient seen and examined during AM rounds. Resting comfortably with no acute events overnight.      PE :  LLE  Dressings c/d/i  Thigh and calf soft and compressible  Unable to obtain motor and sensory exam secondary to patient's baseline dementia and non verbal status  Foot warm and perfused    A/P  73F s/p left suzi arthroplasty:  - Pain control  - DVT PPX   - IS  - Dressing care per ortho team  - WBAT LLE  - No hip precautions  - PT, OOBTC  - Dispo: pending  - Medical management per primary team  - Ortho to continue following

## 2018-03-26 NOTE — SWALLOW BEDSIDE ASSESSMENT ADULT - COMMENTS
+overt s/s of penetration/aspiration w/ large sips of thin, +toleration of controlled sips. Mild oral dysphagia w/o overt s/s of penetration/aspiration

## 2018-03-26 NOTE — SWALLOW BEDSIDE ASSESSMENT ADULT - ASR SWALLOW LINGUAL MOBILITY
gen weakness/impaired anterior elevation/impaired left lateral movement/impaired right lateral movement

## 2018-03-26 NOTE — PHYSICAL THERAPY INITIAL EVALUATION ADULT - GENERAL OBSERVATIONS, REHAB EVAL
[id number] - [id number] Pt rec semifowler in bed with +NC,+bed alarm,+NC,appears in NAD with private aide and  at b/s who pt gave lengthy education on PT POC to. Pt with advanced dementia at baseline, non verbal.

## 2018-03-26 NOTE — PHYSICAL THERAPY INITIAL EVALUATION ADULT - IMPAIRMENTS FOUND, PT EVAL
ergonomics and body mechanics/gait, locomotion, and balance/arousal, attention, and cognition/poor safety awareness/gross motor/joint integrity and mobility/muscle strength

## 2018-03-26 NOTE — PHYSICAL THERAPY INITIAL EVALUATION ADULT - LEVEL OF INDEPENDENCE: SUPINE/SIT, REHAB EVAL
Pt sat up for ~10 min with max assist to maintain seated balance; pt tends to lean postero-laterally to right side./dependent (less than 25% patients effort)

## 2018-03-26 NOTE — PROGRESS NOTE ADULT - ASSESSMENT
73y female with  PMH of Alzheimer dementia, Parkinson's diease was admitted for s/p mechanical fall and her left hip x ray showed left femoral neck displaced fracture s/p left hemiarthroplasty      #Left Femoral neck fracture s/p left hemiarthroplasty  - Pain control  - DVT PPX   - Dressing care per ortho team  - WBAT LLE  - No hip precautions  - PT, OOBTC    Parkinson's disease and Alzheimer dementia:   continue Home medications.     DVT PPX: Heparin sc 73y female with  PMH of Alzheimer dementia, Parkinson's diease was admitted for s/p mechanical fall and her left hip x ray showed left femoral neck displaced fracture s/p left hemiarthroplasty      #Left Femoral neck fracture s/p left hemiarthroplasty  - Pain control  - DVT PPX   - Dressing care per ortho team  - WBAT LLE  - No hip precautions  - PT, OOBTC    **fever + increase wbc  -UA slightly positive  -CXR: Slightly increased bilateral opacity/effusions. but no big consolidations  -remove yancey catheter  -urine and blood culture  -will start levaquin    **Parkinson's disease and Alzheimer dementia:   continue Home medications.     DVT PPX: Heparin sc 73y female with  PMH of Alzheimer dementia, Parkinson's diease was admitted for s/p mechanical fall and her left hip x ray showed left femoral neck displaced fracture s/p left hemiarthroplasty      #Left Femoral neck fracture s/p left hemiarthroplasty  - Pain control  - DVT PPX   - Dressing care per ortho team  - WBAT LLE  - No hip precautions  - PT, OOBTC    **fever + increase wbc  -UA slightly positive  -CXR: Slightly increased bilateral opacity/effusions. but no big consolidations  -remove yancey catheter  -urine and blood culture  -will start levaquin tomorrow if WBC still trending up    **Parkinson's disease and Alzheimer dementia:   continue Home medications.     DVT PPX: Heparin sc

## 2018-03-26 NOTE — PHYSICAL THERAPY INITIAL EVALUATION ADULT - PLANNED THERAPY INTERVENTIONS, PT EVAL
gait training/balance training/bed mobility training/neuromuscular re-education/transfer training/strengthening/postural re-education

## 2018-03-27 LAB
ANION GAP SERPL CALC-SCNC: 8 MMOL/L — SIGNIFICANT CHANGE UP (ref 7–14)
APPEARANCE UR: CLEAR — SIGNIFICANT CHANGE UP
BASOPHILS # BLD AUTO: 0.04 K/UL — SIGNIFICANT CHANGE UP (ref 0–0.2)
BASOPHILS NFR BLD AUTO: 0.3 % — SIGNIFICANT CHANGE UP (ref 0–1)
BILIRUB UR-MCNC: NEGATIVE — SIGNIFICANT CHANGE UP
BUN SERPL-MCNC: 10 MG/DL — SIGNIFICANT CHANGE UP (ref 10–20)
CALCIUM SERPL-MCNC: 7.9 MG/DL — LOW (ref 8.5–10.1)
CHLORIDE SERPL-SCNC: 94 MMOL/L — LOW (ref 98–110)
CO2 SERPL-SCNC: 29 MMOL/L — SIGNIFICANT CHANGE UP (ref 17–32)
COLOR SPEC: SIGNIFICANT CHANGE UP
CREAT SERPL-MCNC: 0.5 MG/DL — LOW (ref 0.7–1.5)
DIFF PNL FLD: NEGATIVE — SIGNIFICANT CHANGE UP
EOSINOPHIL # BLD AUTO: 0.07 K/UL — SIGNIFICANT CHANGE UP (ref 0–0.7)
EOSINOPHIL NFR BLD AUTO: 0.5 % — SIGNIFICANT CHANGE UP (ref 0–8)
EPI CELLS # UR: (no result) /HPF
GLUCOSE SERPL-MCNC: 109 MG/DL — HIGH (ref 70–99)
GLUCOSE UR QL: 100 MG/DL
HCT VFR BLD CALC: 30.8 % — LOW (ref 37–47)
HGB BLD-MCNC: 10.3 G/DL — LOW (ref 12–16)
IMM GRANULOCYTES NFR BLD AUTO: 0.4 % — HIGH (ref 0.1–0.3)
KETONES UR-MCNC: NEGATIVE — SIGNIFICANT CHANGE UP
LEUKOCYTE ESTERASE UR-ACNC: NEGATIVE — SIGNIFICANT CHANGE UP
LYMPHOCYTES # BLD AUTO: 1.56 K/UL — SIGNIFICANT CHANGE UP (ref 1.2–3.4)
LYMPHOCYTES # BLD AUTO: 11.5 % — LOW (ref 20.5–51.1)
MCHC RBC-ENTMCNC: 31.3 PG — HIGH (ref 27–31)
MCHC RBC-ENTMCNC: 33.4 G/DL — SIGNIFICANT CHANGE UP (ref 32–37)
MCV RBC AUTO: 93.6 FL — SIGNIFICANT CHANGE UP (ref 81–99)
MONOCYTES # BLD AUTO: 1.93 K/UL — HIGH (ref 0.1–0.6)
MONOCYTES NFR BLD AUTO: 14.3 % — HIGH (ref 1.7–9.3)
NEUTROPHILS # BLD AUTO: 9.88 K/UL — HIGH (ref 1.4–6.5)
NEUTROPHILS NFR BLD AUTO: 73 % — SIGNIFICANT CHANGE UP (ref 42.2–75.2)
NITRITE UR-MCNC: NEGATIVE — SIGNIFICANT CHANGE UP
PH UR: 7 — SIGNIFICANT CHANGE UP (ref 5–8)
PLATELET # BLD AUTO: 176 K/UL — SIGNIFICANT CHANGE UP (ref 130–400)
POTASSIUM SERPL-MCNC: 3.8 MMOL/L — SIGNIFICANT CHANGE UP (ref 3.5–5)
POTASSIUM SERPL-SCNC: 3.8 MMOL/L — SIGNIFICANT CHANGE UP (ref 3.5–5)
PROT UR-MCNC: 30
RBC # BLD: 3.29 M/UL — LOW (ref 4.2–5.4)
RBC # FLD: 12.7 % — SIGNIFICANT CHANGE UP (ref 11.5–14.5)
SODIUM SERPL-SCNC: 131 MMOL/L — LOW (ref 135–146)
SP GR SPEC: >=1.03 — SIGNIFICANT CHANGE UP (ref 1.01–1.03)
UROBILINOGEN FLD QL: 1 (ref 0.2–0.2)
WBC # BLD: 13.53 K/UL — HIGH (ref 4.8–10.8)
WBC # FLD AUTO: 13.53 K/UL — HIGH (ref 4.8–10.8)

## 2018-03-27 RX ADMIN — SODIUM CHLORIDE 3 MILLILITER(S): 9 INJECTION INTRAMUSCULAR; INTRAVENOUS; SUBCUTANEOUS at 05:46

## 2018-03-27 RX ADMIN — HEPARIN SODIUM 5000 UNIT(S): 5000 INJECTION INTRAVENOUS; SUBCUTANEOUS at 05:50

## 2018-03-27 RX ADMIN — SODIUM CHLORIDE 3 MILLILITER(S): 9 INJECTION INTRAMUSCULAR; INTRAVENOUS; SUBCUTANEOUS at 13:29

## 2018-03-27 RX ADMIN — DIVALPROEX SODIUM 250 MILLIGRAM(S): 500 TABLET, DELAYED RELEASE ORAL at 17:51

## 2018-03-27 RX ADMIN — HEPARIN SODIUM 5000 UNIT(S): 5000 INJECTION INTRAVENOUS; SUBCUTANEOUS at 13:25

## 2018-03-27 RX ADMIN — Medication 650 MILLIGRAM(S): at 18:45

## 2018-03-27 RX ADMIN — RIVASTIGMINE 1 PATCH: 4.6 PATCH, EXTENDED RELEASE TRANSDERMAL at 05:50

## 2018-03-27 RX ADMIN — HEPARIN SODIUM 5000 UNIT(S): 5000 INJECTION INTRAVENOUS; SUBCUTANEOUS at 21:47

## 2018-03-27 RX ADMIN — SODIUM CHLORIDE 3 MILLILITER(S): 9 INJECTION INTRAMUSCULAR; INTRAVENOUS; SUBCUTANEOUS at 21:13

## 2018-03-27 RX ADMIN — DIVALPROEX SODIUM 250 MILLIGRAM(S): 500 TABLET, DELAYED RELEASE ORAL at 05:50

## 2018-03-27 NOTE — PROGRESS NOTE ADULT - ASSESSMENT
73y female with  PMH of Alzheimer dementia, Parkinson's diease was admitted for s/p mechanical fall and her left hip x ray showed left femoral neck displaced fracture s/p left hemiarthroplasty      #Left Femoral neck fracture s/p left hemiarthroplasty  - Pain control  - DVT PPX   - Dressing care per ortho team  - WBAT LLE  - No hip precautions  - PT, OOBTC    #fever + increase wbc  -UA slightly positive  -CXR: Slightly increased bilateral opacity/effusions. but no big consolidations  -levaquin started, wbc uptrending and febrile overnight  -urine and blood cultures pending    #Parkinson's disease and Alzheimer dementia:   continue Home medications, stable    DVT PPX: Heparin SQ  Pending dispo

## 2018-03-27 NOTE — PROGRESS NOTE ADULT - SUBJECTIVE AND OBJECTIVE BOX
ORTHO PROGRESS NOTE       73yFemale POD # 2     S/P left hip hemiarthroplasty     Patient seen and examined at bedside . The patient is resting comfortably,  at bedside.  Vital Signs Last 24 Hrs  T(C): 36.6 (27 Mar 2018 00:00), Max: 36.6 (26 Mar 2018 16:00)  T(F): 97.9 (27 Mar 2018 00:00), Max: 97.9 (26 Mar 2018 16:00)  HR: 88 (27 Mar 2018 00:00) (66 - 88)  BP: 191/86 (27 Mar 2018 00:00) (115/53 - 191/86)  BP(mean): --  RR: 18 (27 Mar 2018 00:00) (18 - 18)  SpO2: --                          12.4   12.43 )-----------( 224      ( 26 Mar 2018 05:54 )             36.7     03-26    137  |  96<L>  |  10  ----------------------------<  123<H>  4.2   |  29  |  0.7    Ca    8.4<L>      26 Mar 2018 05:54        Urinalysis Basic - ( 27 Mar 2018 03:12 )    Color: Dark Yellow / Appearance: Clear / SG: >=1.030 / pH: x  Gluc: x / Ketone: Negative  / Bili: Negative / Urobili: 1.0   Blood: x / Protein: 30 / Nitrite: Negative   Leuk Esterase: Negative / RBC: x / WBC x   Sq Epi: x / Non Sq Epi: Occasional /HPF / Bacteria: x        DVT ppx SQH    MEDICATIONS  (STANDING):  clonazepam 0.125 mg ODT 1 Tablet(s) 1 Tablet(s) Oral daily  dextromethorphan 20 mG/quiNIDine sulfate 10 mG 1 Capsule(s) Oral every 12 hours  diVALproex Sprinkle 250 milliGRAM(s) Oral two times a day  heparin  Injectable 5000 Unit(s) SubCutaneous every 8 hours  levoFLOXacin IVPB 500 milliGRAM(s) IV Intermittent daily  rivastigmine patch 13.3 mG/24 Hr(s) 1 Patch Transdermal every 24 hours  sodium chloride 0.9% lock flush 3 milliLiter(s) IV Push every 8 hours    MEDICATIONS  (PRN):  acetaminophen  Suppository 650 milliGRAM(s) Rectal every 6 hours PRN For Temp greater than 38.5 C (101.3 F)  acetaminophen 300 mG/codeine 30 mG 1 Tablet(s) Oral every 4 hours PRN Moderate Pain (4 - 6)  docusate sodium 100 milliGRAM(s) Oral three times a day PRN Constipation  morphine  - Injectable 2 milliGRAM(s) IV Push every 3 hours PRN Moderate Pain (4 - 6)  senna 2 Tablet(s) Oral at bedtime PRN Constipation      PE:   left hip dressing C/D/I          Compartments soft         NVI, SILT           A/P: 73yFemale POD # 2   S/P  left hip hemiarthroplasty                                                                                                                                             	OOB to Chair            Physical Therapy           Pain control            Incentive Spirometry            DVT Prophylaxis            Discharge planning

## 2018-03-27 NOTE — PROGRESS NOTE ADULT - ATTENDING COMMENTS
agree with above
agree with above  dvt ppx  pt  scd  is  pain control
73y female with  PMH of Alzheimer dementia, Parkinson's diease was admitted for s/p mechanical fall and her left hip x ray showed left femoral neck displaced fracture s/p left hemiarthroplasty.     Unable to obtain ROS due to dementia, but  says he noticed she was coughing    WBC Count: 13.53 K/uL (03-27)  WBC Count: 12.43 K/uL (03-26)  WBC Count: 9.92 K/uL (03-25)    Hemoglobin: 12.4 (03-26)    UCx E. Coli, pansensitive    A/P: Left femoral neck fracture, s/p hemiarthroplasty   - WBAT, out of bed to chair, PT  - incentive spirometry (doubt pt can follow instructions)  - pain controlled with Tylenol 650 mg Q 8 hrs PRN    2. Fever with trending up Leukocytosis, likely UTI vs. Atelectasis  - continue Levaquin, day1    3. Parkinson's disease with dementia  - cont home meds    Prophylactic measures  DVT ppx with Heparin 5000 SQ  GI ppx not indicated  Heart healthy diet  Full code    Dispo - pt needs to be seen by PT again, as per  she is more cooperative and alert if some family member at bedside. PT will re-evaluate the pt.
Agree with resident's note, HPI, PE, assessment and plan except as noted below.    73y female with  PMH of Alzheimer dementia, Parkinson's diease was admitted for s/p mechanical fall and her left hip x ray showed left femoral neck displaced fracture s/p left hemiarthroplasty.     Low grade fever noted.  Unable to obtain ROS due to dementia.    WBC Count: 12.43 K/uL (03-26)  WBC Count: 9.92 K/uL (03-25)  WBC Count: 10.33 K/uL (03-24)    Hemoglobin: 12.4 (03-26)    UA: WBC 15-25, LE moderate positive  UCx pending    CXR: Slightly increased bilateral opacity/effusions.    A/P: Left femoral neck fracture, s/p hemiarthroplasty   - WBAT, out of bed to chair, PT  - incentive spirometry (doubt pt can follow instructions)  - pain controlled with Tylenol 650 mg Q 8 hrs PRN    2. Fever with trending up Leukocytosis, likely UTI v. Atelectasis  - will give Levaquin    3. Parkinson's disease with dementia  - cont home meds    Prophylactic measures  DVT ppx with Heparin 5000 SQ  GI ppx not indicated  Heart healthy diet  Full code    Dispo - SNF, anticipated dc in 48 hrs

## 2018-03-27 NOTE — PROGRESS NOTE ADULT - SUBJECTIVE AND OBJECTIVE BOX
SUBJECTIVE:    Patient is a 73y old  Female who presents with a chief complaint of sp fall (23 Mar 2018 20:52)    Currently admitted to medicine with the primary diagnosis of Hip fracture     Today is hospital day 4d. This morning she is resting comfortably in bed with no reported events.    PAST MEDICAL & SURGICAL HISTORY  PAST MEDICAL & SURGICAL HISTORY:  Glaucoma  Frequent UTI  Parkinson disease  Alzheimer disease  No significant past surgical history    SOCIAL HISTORY:    ALLERGIES:  No Known Allergies    MEDICATIONS:  STANDING MEDICATIONS  clonazepam 0.125 mg ODT 1 Tablet(s) 1 Tablet(s) Oral daily  dextromethorphan 20 mG/quiNIDine sulfate 10 mG 1 Capsule(s) Oral every 12 hours  diVALproex Sprinkle 250 milliGRAM(s) Oral two times a day  heparin  Injectable 5000 Unit(s) SubCutaneous every 8 hours  levoFLOXacin IVPB 500 milliGRAM(s) IV Intermittent daily  rivastigmine patch 13.3 mG/24 Hr(s) 1 Patch Transdermal every 24 hours  sodium chloride 0.9% lock flush 3 milliLiter(s) IV Push every 8 hours    PRN MEDICATIONS  acetaminophen  Suppository 650 milliGRAM(s) Rectal every 6 hours PRN  acetaminophen 300 mG/codeine 30 mG 1 Tablet(s) Oral every 4 hours PRN  docusate sodium 100 milliGRAM(s) Oral three times a day PRN  morphine  - Injectable 2 milliGRAM(s) IV Push every 3 hours PRN  senna 2 Tablet(s) Oral at bedtime PRN    VITALS:   T(F): 99.3  HR: 127  BP: 117/68  RR: 18  SpO2: --    LABS:                        10.3   13.53 )-----------( 176      ( 27 Mar 2018 05:32 )             30.8     03-27    131<L>  |  94<L>  |  10  ----------------------------<  109<H>  3.8   |  29  |  0.5<L>    Ca    7.9<L>      27 Mar 2018 05:32        Urinalysis Basic - ( 27 Mar 2018 03:12 )    Color: Dark Yellow / Appearance: Clear / SG: >=1.030 / pH: x  Gluc: x / Ketone: Negative  / Bili: Negative / Urobili: 1.0   Blood: x / Protein: 30 / Nitrite: Negative   Leuk Esterase: Negative / RBC: x / WBC x   Sq Epi: x / Non Sq Epi: Occasional /HPF / Bacteria: x      RADIOLOGY:    PHYSICAL EXAM:  GEN: No acute distress  HEENT: NC/AT  LUNGS: Clear to auscultation bilaterally   HEART: S1/S2 present. RRR.   ABD: Soft, non-tender, non-distended. Bowel sounds present  EXT: L hip s/p procedure, dressed per ortho  NEURO: at baseline

## 2018-03-28 LAB
ANION GAP SERPL CALC-SCNC: 9 MMOL/L — SIGNIFICANT CHANGE UP (ref 7–14)
BUN SERPL-MCNC: 13 MG/DL — SIGNIFICANT CHANGE UP (ref 10–20)
CALCIUM SERPL-MCNC: 7.9 MG/DL — LOW (ref 8.5–10.1)
CHLORIDE SERPL-SCNC: 97 MMOL/L — LOW (ref 98–110)
CK MB BLD-MCNC: SIGNIFICANT CHANGE UP % (ref 0–4)
CK MB CFR SERPL CALC: 1.7 NG/ML — SIGNIFICANT CHANGE UP (ref 0.6–6.3)
CK MB CFR SERPL CALC: 2 NG/ML — SIGNIFICANT CHANGE UP (ref 0.6–6.3)
CK SERPL-CCNC: 311 U/L — HIGH (ref 0–225)
CK SERPL-CCNC: 360 U/L — HIGH (ref 0–225)
CO2 SERPL-SCNC: 27 MMOL/L — SIGNIFICANT CHANGE UP (ref 17–32)
CREAT SERPL-MCNC: 0.5 MG/DL — LOW (ref 0.7–1.5)
CULTURE RESULTS: SIGNIFICANT CHANGE UP
GLUCOSE SERPL-MCNC: 116 MG/DL — HIGH (ref 70–99)
HCT VFR BLD CALC: 31.2 % — LOW (ref 37–47)
HGB BLD-MCNC: 10.7 G/DL — LOW (ref 12–16)
MAGNESIUM SERPL-MCNC: 2.1 MG/DL — SIGNIFICANT CHANGE UP (ref 1.8–2.4)
MCHC RBC-ENTMCNC: 31.4 PG — HIGH (ref 27–31)
MCHC RBC-ENTMCNC: 34.3 G/DL — SIGNIFICANT CHANGE UP (ref 32–37)
MCV RBC AUTO: 91.5 FL — SIGNIFICANT CHANGE UP (ref 81–99)
NRBC # BLD: 0 /100 WBCS — SIGNIFICANT CHANGE UP (ref 0–0)
PLATELET # BLD AUTO: 228 K/UL — SIGNIFICANT CHANGE UP (ref 130–400)
POTASSIUM SERPL-MCNC: 4 MMOL/L — SIGNIFICANT CHANGE UP (ref 3.5–5)
POTASSIUM SERPL-SCNC: 4 MMOL/L — SIGNIFICANT CHANGE UP (ref 3.5–5)
RBC # BLD: 3.41 M/UL — LOW (ref 4.2–5.4)
RBC # FLD: 12.8 % — SIGNIFICANT CHANGE UP (ref 11.5–14.5)
SODIUM SERPL-SCNC: 133 MMOL/L — LOW (ref 135–146)
SPECIMEN SOURCE: SIGNIFICANT CHANGE UP
SURGICAL PATHOLOGY STUDY: SIGNIFICANT CHANGE UP
TROPONIN T SERPL-MCNC: <0.01 NG/ML — SIGNIFICANT CHANGE UP
TROPONIN T SERPL-MCNC: <0.01 NG/ML — SIGNIFICANT CHANGE UP
WBC # BLD: 18.13 K/UL — HIGH (ref 4.8–10.8)
WBC # FLD AUTO: 18.13 K/UL — HIGH (ref 4.8–10.8)

## 2018-03-28 RX ORDER — SODIUM CHLORIDE 9 MG/ML
500 INJECTION INTRAMUSCULAR; INTRAVENOUS; SUBCUTANEOUS ONCE
Qty: 0 | Refills: 0 | Status: COMPLETED | OUTPATIENT
Start: 2018-03-28 | End: 2018-03-28

## 2018-03-28 RX ORDER — CEFEPIME 1 G/1
1000 INJECTION, POWDER, FOR SOLUTION INTRAMUSCULAR; INTRAVENOUS EVERY 8 HOURS
Qty: 0 | Refills: 0 | Status: DISCONTINUED | OUTPATIENT
Start: 2018-03-28 | End: 2018-03-30

## 2018-03-28 RX ORDER — VANCOMYCIN HCL 1 G
900 VIAL (EA) INTRAVENOUS ONCE
Qty: 0 | Refills: 0 | Status: DISCONTINUED | OUTPATIENT
Start: 2018-03-28 | End: 2018-03-28

## 2018-03-28 RX ORDER — AMIODARONE HYDROCHLORIDE 400 MG/1
0.5 TABLET ORAL
Qty: 900 | Refills: 0 | Status: DISCONTINUED | OUTPATIENT
Start: 2018-03-28 | End: 2018-03-30

## 2018-03-28 RX ORDER — VANCOMYCIN HCL 1 G
1000 VIAL (EA) INTRAVENOUS ONCE
Qty: 0 | Refills: 0 | Status: COMPLETED | OUTPATIENT
Start: 2018-03-28 | End: 2018-03-28

## 2018-03-28 RX ORDER — SODIUM CHLORIDE 9 MG/ML
1000 INJECTION INTRAMUSCULAR; INTRAVENOUS; SUBCUTANEOUS ONCE
Qty: 0 | Refills: 0 | Status: COMPLETED | OUTPATIENT
Start: 2018-03-28 | End: 2018-03-28

## 2018-03-28 RX ORDER — ENOXAPARIN SODIUM 100 MG/ML
60 INJECTION SUBCUTANEOUS
Qty: 0 | Refills: 0 | Status: DISCONTINUED | OUTPATIENT
Start: 2018-03-28 | End: 2018-03-31

## 2018-03-28 RX ORDER — METOPROLOL TARTRATE 50 MG
5 TABLET ORAL ONCE
Qty: 0 | Refills: 0 | Status: COMPLETED | OUTPATIENT
Start: 2018-03-28 | End: 2018-03-28

## 2018-03-28 RX ORDER — LACTULOSE 10 G/15ML
15 SOLUTION ORAL THREE TIMES A DAY
Qty: 0 | Refills: 0 | Status: DISCONTINUED | OUTPATIENT
Start: 2018-03-28 | End: 2018-03-31

## 2018-03-28 RX ORDER — AMIODARONE HYDROCHLORIDE 400 MG/1
1 TABLET ORAL
Qty: 900 | Refills: 0 | Status: DISCONTINUED | OUTPATIENT
Start: 2018-03-28 | End: 2018-03-30

## 2018-03-28 RX ORDER — VANCOMYCIN HCL 1 G
VIAL (EA) INTRAVENOUS
Qty: 0 | Refills: 0 | Status: DISCONTINUED | OUTPATIENT
Start: 2018-03-28 | End: 2018-03-28

## 2018-03-28 RX ORDER — AMIODARONE HYDROCHLORIDE 400 MG/1
150 TABLET ORAL ONCE
Qty: 0 | Refills: 0 | Status: COMPLETED | OUTPATIENT
Start: 2018-03-28 | End: 2018-03-28

## 2018-03-28 RX ORDER — CEFEPIME 1 G/1
1000 INJECTION, POWDER, FOR SOLUTION INTRAMUSCULAR; INTRAVENOUS ONCE
Qty: 0 | Refills: 0 | Status: COMPLETED | OUTPATIENT
Start: 2018-03-28 | End: 2018-03-28

## 2018-03-28 RX ORDER — CEFEPIME 1 G/1
INJECTION, POWDER, FOR SOLUTION INTRAMUSCULAR; INTRAVENOUS
Qty: 0 | Refills: 0 | Status: DISCONTINUED | OUTPATIENT
Start: 2018-03-28 | End: 2018-03-30

## 2018-03-28 RX ORDER — VANCOMYCIN HCL 1 G
VIAL (EA) INTRAVENOUS
Qty: 0 | Refills: 0 | Status: DISCONTINUED | OUTPATIENT
Start: 2018-03-28 | End: 2018-03-30

## 2018-03-28 RX ORDER — VANCOMYCIN HCL 1 G
1000 VIAL (EA) INTRAVENOUS EVERY 12 HOURS
Qty: 0 | Refills: 0 | Status: DISCONTINUED | OUTPATIENT
Start: 2018-03-29 | End: 2018-03-30

## 2018-03-28 RX ADMIN — Medication 5 MILLIGRAM(S): at 09:50

## 2018-03-28 RX ADMIN — SODIUM CHLORIDE 3 MILLILITER(S): 9 INJECTION INTRAMUSCULAR; INTRAVENOUS; SUBCUTANEOUS at 21:08

## 2018-03-28 RX ADMIN — AMIODARONE HYDROCHLORIDE 33.33 MG/MIN: 400 TABLET ORAL at 11:45

## 2018-03-28 RX ADMIN — MORPHINE SULFATE 2 MILLIGRAM(S): 50 CAPSULE, EXTENDED RELEASE ORAL at 14:25

## 2018-03-28 RX ADMIN — LACTULOSE 15 GRAM(S): 10 SOLUTION ORAL at 18:39

## 2018-03-28 RX ADMIN — SODIUM CHLORIDE 2000 MILLILITER(S): 9 INJECTION INTRAMUSCULAR; INTRAVENOUS; SUBCUTANEOUS at 11:45

## 2018-03-28 RX ADMIN — Medication 250 MILLIGRAM(S): at 16:25

## 2018-03-28 RX ADMIN — ENOXAPARIN SODIUM 60 MILLIGRAM(S): 100 INJECTION SUBCUTANEOUS at 13:56

## 2018-03-28 RX ADMIN — SODIUM CHLORIDE 2000 MILLILITER(S): 9 INJECTION INTRAMUSCULAR; INTRAVENOUS; SUBCUTANEOUS at 09:00

## 2018-03-28 RX ADMIN — ENOXAPARIN SODIUM 60 MILLIGRAM(S): 100 INJECTION SUBCUTANEOUS at 18:37

## 2018-03-28 RX ADMIN — AMIODARONE HYDROCHLORIDE 618 MILLIGRAM(S): 400 TABLET ORAL at 11:42

## 2018-03-28 RX ADMIN — SODIUM CHLORIDE 1000 MILLILITER(S): 9 INJECTION INTRAMUSCULAR; INTRAVENOUS; SUBCUTANEOUS at 16:11

## 2018-03-28 RX ADMIN — DIVALPROEX SODIUM 250 MILLIGRAM(S): 500 TABLET, DELAYED RELEASE ORAL at 05:43

## 2018-03-28 RX ADMIN — SODIUM CHLORIDE 2000 MILLILITER(S): 9 INJECTION INTRAMUSCULAR; INTRAVENOUS; SUBCUTANEOUS at 09:37

## 2018-03-28 RX ADMIN — MORPHINE SULFATE 2 MILLIGRAM(S): 50 CAPSULE, EXTENDED RELEASE ORAL at 14:55

## 2018-03-28 RX ADMIN — SODIUM CHLORIDE 3 MILLILITER(S): 9 INJECTION INTRAMUSCULAR; INTRAVENOUS; SUBCUTANEOUS at 14:01

## 2018-03-28 RX ADMIN — RIVASTIGMINE 1 PATCH: 4.6 PATCH, EXTENDED RELEASE TRANSDERMAL at 05:43

## 2018-03-28 RX ADMIN — HEPARIN SODIUM 5000 UNIT(S): 5000 INJECTION INTRAVENOUS; SUBCUTANEOUS at 05:43

## 2018-03-28 RX ADMIN — CEFEPIME 100 MILLIGRAM(S): 1 INJECTION, POWDER, FOR SOLUTION INTRAMUSCULAR; INTRAVENOUS at 14:25

## 2018-03-28 RX ADMIN — SODIUM CHLORIDE 3 MILLILITER(S): 9 INJECTION INTRAMUSCULAR; INTRAVENOUS; SUBCUTANEOUS at 05:44

## 2018-03-28 RX ADMIN — CEFEPIME 100 MILLIGRAM(S): 1 INJECTION, POWDER, FOR SOLUTION INTRAMUSCULAR; INTRAVENOUS at 21:13

## 2018-03-28 RX ADMIN — AMIODARONE HYDROCHLORIDE 16.67 MG/MIN: 400 TABLET ORAL at 18:38

## 2018-03-28 RX ADMIN — DIVALPROEX SODIUM 250 MILLIGRAM(S): 500 TABLET, DELAYED RELEASE ORAL at 18:37

## 2018-03-28 RX ADMIN — RIVASTIGMINE 1 PATCH: 4.6 PATCH, EXTENDED RELEASE TRANSDERMAL at 05:44

## 2018-03-28 NOTE — PROGRESS NOTE ADULT - SUBJECTIVE AND OBJECTIVE BOX
SUBJECTIVE:    Patient is a 73y old  Female who presents with a chief complaint of sp fall (23 Mar 2018 20:52)    Currently admitted to medicine with the primary diagnosis of Hip fracture, upgraded to telemetry this AM for new onset Afib with RVR     Today is hospital day 5. This morning she was found to be tachycardic, with STAT bedside EKG showing Afib with RVR, intial sbp 115/61, HR 160s-170s. Patient was given cardizem IV push 5mg, cardiology was consulted, and stat cardiac enzymes, venous duplex, 2d echo ordered. Patient was transferred to telemetry and started on amiodarone drip to control HR. Antibiotics were escalated per hospitalist recs, family updated regarding patient's critical clinical status.     PAST MEDICAL & SURGICAL HISTORY  PAST MEDICAL & SURGICAL HISTORY:  Glaucoma  Frequent UTI  Parkinson disease  Alzheimer disease  No significant past surgical history    SOCIAL HISTORY:    ALLERGIES:  No Known Allergies    MEDICATIONS:  STANDING MEDICATIONS  amiodarone Infusion 1 mG/Min IV Continuous <Continuous>  amiodarone Infusion 0.5 mG/Min IV Continuous <Continuous>  amiodarone IVPB 150 milliGRAM(s) IV Intermittent once  cefepime  IVPB      clonazepam 0.125 mg ODT 1 Tablet(s) 1 Tablet(s) Oral daily  dextromethorphan 20 mG/quiNIDine sulfate 10 mG 1 Capsule(s) Oral every 12 hours  diVALproex Sprinkle 250 milliGRAM(s) Oral two times a day  enoxaparin Injectable 60 milliGRAM(s) SubCutaneous two times a day  rivastigmine patch 13.3 mG/24 Hr(s) 1 Patch Transdermal every 24 hours  sodium chloride 0.9% Bolus 1000 milliLiter(s) IV Bolus once  sodium chloride 0.9% lock flush 3 milliLiter(s) IV Push every 8 hours  vancomycin  IVPB        PRN MEDICATIONS  acetaminophen  Suppository 650 milliGRAM(s) Rectal every 6 hours PRN  acetaminophen 300 mG/codeine 30 mG 1 Tablet(s) Oral every 4 hours PRN  docusate sodium 100 milliGRAM(s) Oral three times a day PRN  morphine  - Injectable 2 milliGRAM(s) IV Push every 3 hours PRN  senna 2 Tablet(s) Oral at bedtime PRN    VITALS:   T(F): 99.6  HR: 158  BP: 114/61  RR: 18  SpO2: 98%    LABS:                        10.7   18.13 )-----------( 228      ( 28 Mar 2018 08:05 )             31.2     03-28    133<L>  |  97<L>  |  13  ----------------------------<  116<H>  4.0   |  27  |  0.5<L>    Ca    7.9<L>      28 Mar 2018 08:05        Urinalysis Basic - ( 27 Mar 2018 03:12 )    Color: Dark Yellow / Appearance: Clear / SG: >=1.030 / pH: x  Gluc: x / Ketone: Negative  / Bili: Negative / Urobili: 1.0   Blood: x / Protein: 30 / Nitrite: Negative   Leuk Esterase: Negative / RBC: x / WBC x   Sq Epi: x / Non Sq Epi: Occasional /HPF / Bacteria: x        Creatine Kinase, Serum: 360 U/L <H> (03-28-18 @ 09:39)  Troponin T, Serum: <0.01 ng/mL (03-28-18 @ 09:39)      Culture - Blood (collected 26 Mar 2018 17:10)  Source: .Blood None  Preliminary Report (27 Mar 2018 23:01):    No growth to date.    Culture - Blood (collected 26 Mar 2018 11:39)  Source: .Blood None  Preliminary Report (27 Mar 2018 16:01):    No growth to date.      CARDIAC MARKERS ( 28 Mar 2018 09:39 )  x     / <0.01 ng/mL / 360 U/L / x     / 2.0 ng/mL      RADIOLOGY:    PHYSICAL EXAM:  GEN: No acute distress, resting with her eyes closed, responds to name  HEENT: NC/AT  LUNGS: Clear to auscultation bilaterally   HEART: rapid afib  ABD: Soft, non-tender, non-distended. Bowel sounds present  EXT: MAEx4, L hip movement limited post-op  NEURO: Appears at baseline

## 2018-03-28 NOTE — PROGRESS NOTE ADULT - SUBJECTIVE AND OBJECTIVE BOX
Patient seen and examined during AM rounds. Resting comfortably, no active complaints. Patient started on levaquin by the primary team.     PE :   LLE  Dressings c/d/i  Thigh and calf soft and compressible  Motor and sensory exam unobtainable given baseline status  Foot warm and perfused

## 2018-03-28 NOTE — CONSULT NOTE ADULT - ASSESSMENT
73 year old F with dementia, admitted for left hip fracture s/p surgery now found to have new Aflutter with high suspicion for PE vs sepsis.    Plan:  stat CTA chest r/p PE  therapeutic lovenox and dc if ct neg for PE  bedside echo by cardio fellow did not signs of right heart strain  f/u official echo result  Amiodarone 150mg x1 then drip  cannot cardiovert due to DNR status  check TSH, CEx2, electrolytes  volume resuscitation with fluids  f/u blood cultures and c/w empiric iv antibx 73 year old F with dementia, admitted for left hip fracture s/p surgery now found to have new Aflutter with high suspicion for PE vs sepsis.    Plan:  stat CTA chest r/p PE  therapeutic lovenox  bedside echo by cardio fellow did not signs of right heart strain  f/u official echo result  Amiodarone 150mg x1 then drip  Po amio  check TSH, CEx2, electrolytes  volume resuscitation with fluids  f/u blood cultures and c/w empiric iv antibx

## 2018-03-28 NOTE — PROGRESS NOTE ADULT - ASSESSMENT
A/P  73F s/p left suzi arthroplasty:  - Pain control  - DVT PPX  - IS  - Encourage ambulation, PT, OT, OOBTC  - Antibiotic and medical management per primary team  - Dispo planning  - Ortho to continue following

## 2018-03-28 NOTE — PROGRESS NOTE ADULT - SUBJECTIVE AND OBJECTIVE BOX
ELSY ISSA    Patient is a 73y old  Female who presents with a chief complaint of sp fall (23 Mar 2018 20:52)    HPI:  73y F with MHx of parkinson's disease with dementia, frequent UTI, glaucoma, presented s/p mechanical fall at home after trying to get up from a seated position on the couch. Pt did not have any trauma to her head, loss of consciousness.  Family states that patient does often tend to get up and sit down often from being restless on her parkinson medications, but this is the only fall she's had over past year.   Pt admitted under impression of Left hip fracture, underwent Left hemiarthroplasty on 03/25, tolerated surgery well. Post op course complicated by fever, work up was positive for E. Coli UTI with ?PNA vs. atelectasis. Pt was started on Levaquin. This AM pt was noted tachycardic, EKG: A. Fibrillation with RVR.      ROS: unable to obtain ROS due to pt's baseline dementia.    PHYSICAL EXAM:  T(C): 37.6, Max: 39.3 (03-27-18 @ 18:39), last fever 102.7 03/27  HR: 153 (70 - 153)  BP: 115/53 (107/61 - 154/70)  RR: 18 (18 - 18)    GENERAL: NAD, but looks uncomfortable, non-toxic looking  NECK: Supple, No JVD  CHEST/LUNG: No rales, rhonchi, wheezing  HEART: tachycardia, no chest wall tenderness  GI/ABDOMEN: Soft, Nontender, Nondistended; Bowel sounds present  EXTREMITIES:  2+ Peripheral Pulses, No clubbing, cyanosis, or edema, no deformity. No calf tenderness b/l.  SKIN: No rashes or lesions  NERVOUS SYSTEM:  awake, grimacing on pain    LABS:                        10.7   18.13 )-----------( 228      ( 28 Mar 2018 08:05 )             31.2     03-27    131<L>  |  94<L>  |  10  ----------------------------<  109<H>  3.8   |  29  |  0.5<L>    Ca    7.9<L>      27 Mar 2018 05:32    Urinalysis Basic - ( 27 Mar 2018 03:12 )    Color: Dark Yellow / Appearance: Clear / SG: >=1.030 / pH: x  Gluc: x / Ketone: Negative  / Bili: Negative / Urobili: 1.0   Blood: x / Protein: 30 / Nitrite: Negative   Leuk Esterase: Negative / RBC: x / WBC x   Sq Epi: x / Non Sq Epi: Occasional /HPF / Bacteria: x    Culture - Blood (collected 26 Mar 2018 17:10)  Source: .Blood None  Preliminary Report (27 Mar 2018 23:01):    No growth to date.    Culture - Blood (collected 26 Mar 2018 11:39)  Source: .Blood None  Preliminary Report (27 Mar 2018 16:01):    No growth to date.    Urine Cx : E. Coli pansensitive     MEDICATIONS  (STANDING):  clonazepam 0.125 mg ODT 1 Tablet(s) 1 Tablet(s) Oral daily  dextromethorphan 20 mG/quiNIDine sulfate 10 mG 1 Capsule(s) Oral every 12 hours  diVALproex Sprinkle 250 milliGRAM(s) Oral two times a day  heparin  Injectable 5000 Unit(s) SubCutaneous every 8 hours  levoFLOXacin IVPB 500 milliGRAM(s) IV Intermittent daily  rivastigmine patch 13.3 mG/24 Hr(s) 1 Patch Transdermal every 24 hours  sodium chloride 0.9% Bolus 500 milliLiter(s) IV Bolus once  sodium chloride 0.9% lock flush 3 milliLiter(s) IV Push every 8 hours    MEDICATIONS  (PRN):  acetaminophen  Suppository 650 milliGRAM(s) Rectal every 6 hours PRN For Temp greater than 38.5 C (101.3 F)  acetaminophen 300 mG/codeine 30 mG 1 Tablet(s) Oral every 4 hours PRN Moderate Pain (4 - 6)  docusate sodium 100 milliGRAM(s) Oral three times a day PRN Constipation  morphine  - Injectable 2 milliGRAM(s) IV Push every 3 hours PRN Moderate Pain (4 - 6)  senna 2 Tablet(s) Oral at bedtime PRN Constipation

## 2018-03-28 NOTE — PROGRESS NOTE ADULT - ASSESSMENT
1. New onset A. Fib with RVR  - transfer to tele  - Cardizem 5 mg push, if still RVR - Cardizem gtt  - send CE x2  - ECHO  - US LE  - cardiology eval  - start ASA, CHADSVASC 1, no AC    2. Pt fits criteria sepsis: fever, elevated WBC, tachycardia  - NS 1 L IV bolus now  - will escalate ABx to Cefepime and Vanco  - repeat CXR, and BCx  - send LA    3. Traumatic Left hip fracture, s/p HAP  - cont DVT ppx  - resume PT when stable    4. Parkinson's disease - cont home meds.

## 2018-03-28 NOTE — PROGRESS NOTE ADULT - ASSESSMENT
1. New onset A. Fib with RVR  - patient transferred to tele with appropriate sign out given to Dr Melgoza   - will start amiodarone drip per cardio fellow Dr Porras  - cardiac enzymes -x1, f/u repeat  - 2d ECHO pending  - US LE stat ordered  - cardiology following closely, possible plan for cardioversion discussed with daughter Demi via telephone, she will present to bedside with patient's  Ibrahima, who is HCP for further decision-making      2. Pt fits criteria for sepsis: fever, elevated WBC, tachycardia  - NS 1 L IV bolus given, pressure still 90s/50s  - will escalate ABx to Cefepime and Vanco  - repeat CXR, and BCx pending    3. Traumatic Left hip fracture, s/p HAP  - cont DVT ppx  - follow up with PT when stable    4. Parkinson's disease - cont home meds.    Patient is DNR/DNI

## 2018-03-29 LAB
CULTURE RESULTS: SIGNIFICANT CHANGE UP
SPECIMEN SOURCE: SIGNIFICANT CHANGE UP

## 2018-03-29 RX ORDER — AMIODARONE HYDROCHLORIDE 400 MG/1
400 TABLET ORAL
Qty: 0 | Refills: 0 | Status: DISCONTINUED | OUTPATIENT
Start: 2018-03-29 | End: 2018-03-31

## 2018-03-29 RX ADMIN — Medication 250 MILLIGRAM(S): at 18:49

## 2018-03-29 RX ADMIN — SODIUM CHLORIDE 3 MILLILITER(S): 9 INJECTION INTRAMUSCULAR; INTRAVENOUS; SUBCUTANEOUS at 22:32

## 2018-03-29 RX ADMIN — DIVALPROEX SODIUM 250 MILLIGRAM(S): 500 TABLET, DELAYED RELEASE ORAL at 05:26

## 2018-03-29 RX ADMIN — AMIODARONE HYDROCHLORIDE 400 MILLIGRAM(S): 400 TABLET ORAL at 18:53

## 2018-03-29 RX ADMIN — RIVASTIGMINE 1 PATCH: 4.6 PATCH, EXTENDED RELEASE TRANSDERMAL at 06:43

## 2018-03-29 RX ADMIN — SODIUM CHLORIDE 3 MILLILITER(S): 9 INJECTION INTRAMUSCULAR; INTRAVENOUS; SUBCUTANEOUS at 12:08

## 2018-03-29 RX ADMIN — AMIODARONE HYDROCHLORIDE 400 MILLIGRAM(S): 400 TABLET ORAL at 12:10

## 2018-03-29 RX ADMIN — Medication 250 MILLIGRAM(S): at 05:44

## 2018-03-29 RX ADMIN — CEFEPIME 100 MILLIGRAM(S): 1 INJECTION, POWDER, FOR SOLUTION INTRAMUSCULAR; INTRAVENOUS at 13:55

## 2018-03-29 RX ADMIN — ENOXAPARIN SODIUM 60 MILLIGRAM(S): 100 INJECTION SUBCUTANEOUS at 05:26

## 2018-03-29 RX ADMIN — RIVASTIGMINE 1 PATCH: 4.6 PATCH, EXTENDED RELEASE TRANSDERMAL at 05:26

## 2018-03-29 RX ADMIN — SODIUM CHLORIDE 3 MILLILITER(S): 9 INJECTION INTRAMUSCULAR; INTRAVENOUS; SUBCUTANEOUS at 05:26

## 2018-03-29 RX ADMIN — DIVALPROEX SODIUM 250 MILLIGRAM(S): 500 TABLET, DELAYED RELEASE ORAL at 18:49

## 2018-03-29 RX ADMIN — CEFEPIME 100 MILLIGRAM(S): 1 INJECTION, POWDER, FOR SOLUTION INTRAMUSCULAR; INTRAVENOUS at 22:50

## 2018-03-29 RX ADMIN — CEFEPIME 100 MILLIGRAM(S): 1 INJECTION, POWDER, FOR SOLUTION INTRAMUSCULAR; INTRAVENOUS at 05:27

## 2018-03-29 RX ADMIN — ENOXAPARIN SODIUM 60 MILLIGRAM(S): 100 INJECTION SUBCUTANEOUS at 18:49

## 2018-03-29 NOTE — SWALLOW BEDSIDE ASSESSMENT ADULT - SWALLOW EVAL: CURRENT DIET
Mechanical soft ground with thin liquids
Mechanical soft ground with thin liquids; pt currently NPO for CT
NPO for OR
NPO for OR.
puree diet w thin liquids
Mechanical soft ground with thin liquids

## 2018-03-29 NOTE — DIETITIAN INITIAL EVALUATION ADULT. - OTHER INFO
S/P L hip arthroplasty and upgraded to tele w/ new onset AFIB. Pt fam member reports very good ck/PO intake. Tolerates mechanical ground diet. Does not know UBW, but no recent weight change. NKFA.

## 2018-03-29 NOTE — SWALLOW BEDSIDE ASSESSMENT ADULT - SWALLOW EVAL: RECOMMENDED DIET
Mechanical soft ground with thin liquids
Mechanical soft ground with thin liquids
Mechanical soft ground with thin liquids. Only feed pt when awake and alert
Mechanical Soft ground w/ thin

## 2018-03-29 NOTE — SWALLOW BEDSIDE ASSESSMENT ADULT - SLP GENERAL OBSERVATIONS
Pt asleep, family at bedside requesting education. Pt currently NPO for CT
Pt asleep, +lethargy noted. pt in no apparent pain. Family at bedside educated by SLP
Pt awake, no c/o pain. Pts  and HHA at bedside. Report pt with +toleration of current diet.
Pt received in bed awake and alert on room air, non-verbal at baseline per HHA

## 2018-03-29 NOTE — PROGRESS NOTE ADULT - SUBJECTIVE AND OBJECTIVE BOX
Patient is a 73y old  Female who presents with a chief complaint of sp fall (23 Mar 2018 20:52)    HPI:  73y F,  at baseline ambulatory with assistance, w pmh listed below presented s/p mechanical fall at home.       Pt lives at home with aid.  Per aid,  pt fell onto her knees after trying to get up from a seated position on the couch.  Pt did not have any trauma to her head, loss of consciousness.  Family states that patient does often tend to get up and sit down often from being restless on her parkinson medications, but this is the only fall she's had over past year.     Unable to ask if patient felt any lightheadedness / weakness / palpitations prior to fall because patient is nonverbal at baseline due to underlying dementia. (23 Mar 2018 20:52)    PAST MEDICAL & SURGICAL HISTORY:  Glaucoma  Frequent UTI  Parkinson disease  Alzheimer disease  No significant past surgical history    overnight events noted---upgraded to telemetry for Aflutter/AF with rvr yesterday and started on iv amio gtt- converted to NSR now-     Vital Signs Last 24 Hrs  T(C): 35.7 (29 Mar 2018 11:06), Max: 36.6 (28 Mar 2018 21:01)  T(F): 96.2 (29 Mar 2018 11:06), Max: 97.9 (29 Mar 2018 06:00)  HR: 72 (29 Mar 2018 11:06) (57 - 76)  BP: 95/49 (29 Mar 2018 11:06) (89/54 - 113/56)  BP(mean): --  RR: 18 (29 Mar 2018 11:06) (16 - 18)  SpO2: 95% (29 Mar 2018 08:29) (95% - 95%)             PE:  GEN-NAD, oob in chair, nonverbal- unable to follow commands  CHEST- Clear to auscultation bilaterally, fair air entry  CVS- +s1/s2 RRR no murmurs  ABD- soft NT ND +bs  EXT- Left hip surgical site c/d/i- no erythema  SKIN- no rashes              10.7   18.13 )-----------( 228      ( 28 Mar 2018 08:05 )             31.2     03-28    133<L>  |  97<L>  |  13  ----------------------------<  116<H>  4.0   |  27  |  0.5<L>    Ca    7.9<L>      28 Mar 2018 08:05  Mg     2.1     03-28      CARDIAC MARKERS ( 28 Mar 2018 19:14 )  x     / <0.01 ng/mL / 311 U/L / x     / 1.7 ng/mL  CARDIAC MARKERS ( 28 Mar 2018 09:39 )  x     / <0.01 ng/mL / 360 U/L / x     / 2.0 ng/mL          Culture - Blood (collected 27 Mar 2018 05:32)  Source: .Blood None  Preliminary Report (28 Mar 2018 15:05):    No growth to date.    Culture - Urine (collected 27 Mar 2018 03:12)  Source: .Urine Catheterized  Final Report (28 Mar 2018 14:04):    <10,000 CFU/ml    Normal Urogenital angie present    Culture - Blood (collected 26 Mar 2018 17:10)  Source: .Blood None  Preliminary Report (27 Mar 2018 23:01):    No growth to date.        MEDICATIONS  (STANDING):  amiodarone    Tablet 400 milliGRAM(s) Oral two times a day  amiodarone Infusion 1 mG/Min (33.333 mL/Hr) IV Continuous <Continuous>  amiodarone Infusion 0.5 mG/Min (16.667 mL/Hr) IV Continuous <Continuous>  cefepime  IVPB      cefepime  IVPB 1000 milliGRAM(s) IV Intermittent every 8 hours  clonazepam 0.125 mg ODT 1 Tablet(s) 1 Tablet(s) Oral daily  dextromethorphan 20 mG/quiNIDine sulfate 10 mG 1 Capsule(s) Oral every 12 hours  diVALproex Sprinkle 250 milliGRAM(s) Oral two times a day  enoxaparin Injectable 60 milliGRAM(s) SubCutaneous two times a day  rivastigmine patch 13.3 mG/24 Hr(s) 1 Patch Transdermal every 24 hours  sodium chloride 0.9% lock flush 3 milliLiter(s) IV Push every 8 hours  vancomycin  IVPB      vancomycin  IVPB 1000 milliGRAM(s) IV Intermittent every 12 hours

## 2018-03-29 NOTE — SWALLOW BEDSIDE ASSESSMENT ADULT - SWALLOW EVAL: DIAGNOSIS
+mild oral dysphagia for soft,+min overt s/s of penetration/aspiration w/ large sips of thin +toleration of controlled sips of thin, puree and soft w/o overt s/s of penetration/aspiration
PO trials not provided 2' pt currently NPO for CT
Pt with +toleration of thin liquids and mechanical soft with no s/s aspiration/penetration
Pt. currently NPO for OR, not appropriate for PO trials.
Pt. is NPO for OR today, therefore remains not appropriate for swallow assessment. SLP to f/u.
PO trials not appropriate at this time 2' pt lethargic; per RN pt received medication at noon that made her drowsy. HHA at bedside reports pt with +toleration of current diet

## 2018-03-29 NOTE — PROGRESS NOTE ADULT - ASSESSMENT
New onset A. Fib with RVR  -c/w tele  -c/w amiodarone drip per cardio and then start PO 400mg q12h  - cardiac enzymes(-)x2  - f/u 2D echo and LE duplex  - pt had run if 12 beats of VT in AM. Asymptomatic, vitally stable.    Sepsis  - NS 1 L IV bolus x3 given  -c/w cefepime and vancomycin  - CXR(3/28): Improving bibasilar opacities. No effusions  -blood cx(3/26) NGTD x2    Traumatic Left hip fracture, s/p HAP 3/25  - c/w DVT ppx  - f/u PT    Parkinson's disease   -c/w home meds.    Patient is DNR/DNI    Dispo  -STR on d/c New onset A. Fib with RVR  -c/w tele  -c/w amiodarone drip per cardio and then start PO 400mg q12h  - cardiac enzymes(-)x2  - f/u 2D echo   -LE duplex (-)  - pt had run if 12 beats of VT in AM. Asymptomatic, vitally stable.    Sepsis  - NS 1 L IV bolus x3 given  -c/w cefepime and vancomycin  - CXR(3/28): Improving bibasilar opacities. No effusions  -blood cx(3/26) NGTD x2  urine cx: normal angie  -f/u ID    Traumatic Left hip fracture, s/p HAP 3/25  - c/w DVT ppx  - f/u PT    Parkinson's disease   -c/w home meds.    Patient is DNR/DNI    Dispo  -STR on d/c

## 2018-03-29 NOTE — PROGRESS NOTE ADULT - SUBJECTIVE AND OBJECTIVE BOX
SUBJECTIVE:    Patient is a 73y old Female who presents with a chief complaint of sp fall (23 Mar 2018 20:52)    Currently admitted to medicine with the primary diagnosis of Hip fracture     Today is hospital day 6d. This morning she is resting comfortably in bed and reports no new issues or overnight events.     PAST MEDICAL & SURGICAL HISTORY  Glaucoma  Frequent UTI  Parkinson disease  Alzheimer disease  No significant past surgical history    SOCIAL HISTORY:  Negative for smoking/alcohol/drug use.     ALLERGIES:  No Known Allergies    MEDICATIONS:  STANDING MEDICATIONS  amiodarone    Tablet 400 milliGRAM(s) Oral two times a day  amiodarone Infusion 1 mG/Min IV Continuous <Continuous>  amiodarone Infusion 0.5 mG/Min IV Continuous <Continuous>  cefepime  IVPB      cefepime  IVPB 1000 milliGRAM(s) IV Intermittent every 8 hours  clonazepam 0.125 mg ODT 1 Tablet(s) 1 Tablet(s) Oral daily  dextromethorphan 20 mG/quiNIDine sulfate 10 mG 1 Capsule(s) Oral every 12 hours  diVALproex Sprinkle 250 milliGRAM(s) Oral two times a day  enoxaparin Injectable 60 milliGRAM(s) SubCutaneous two times a day  rivastigmine patch 13.3 mG/24 Hr(s) 1 Patch Transdermal every 24 hours  sodium chloride 0.9% lock flush 3 milliLiter(s) IV Push every 8 hours  vancomycin  IVPB      vancomycin  IVPB 1000 milliGRAM(s) IV Intermittent every 12 hours    PRN MEDICATIONS  acetaminophen  Suppository 650 milliGRAM(s) Rectal every 6 hours PRN  acetaminophen 300 mG/codeine 30 mG 1 Tablet(s) Oral every 4 hours PRN  docusate sodium 100 milliGRAM(s) Oral three times a day PRN  lactulose Syrup 15 Gram(s) Oral three times a day PRN  morphine  - Injectable 2 milliGRAM(s) IV Push every 3 hours PRN  senna 2 Tablet(s) Oral at bedtime PRN    VITALS:   T(F): 97.9  HR: 72  BP: 109/56  RR: 18  SpO2: 95%    LABS:                        10.7   18.13 )-----------( 228      ( 28 Mar 2018 08:05 )             31.2     03-28    133<L>  |  97<L>  |  13  ----------------------------<  116<H>  4.0   |  27  |  0.5<L>    Ca    7.9<L>      28 Mar 2018 08:05  Mg     2.1     03-28            Creatine Kinase, Serum: 311 U/L <H> (03-28-18 @ 19:14)  Troponin T, Serum: <0.01 ng/mL (03-28-18 @ 19:14)      Culture - Blood (collected 27 Mar 2018 05:32)  Source: .Blood None  Preliminary Report (28 Mar 2018 15:05):    No growth to date.    Culture - Urine (collected 27 Mar 2018 03:12)  Source: .Urine Catheterized  Final Report (28 Mar 2018 14:04):    <10,000 CFU/ml    Normal Urogenital angie present    Culture - Blood (collected 26 Mar 2018 17:10)  Source: .Blood None  Preliminary Report (27 Mar 2018 23:01):    No growth to date.    Culture - Blood (collected 26 Mar 2018 11:39)  Source: .Blood None  Preliminary Report (27 Mar 2018 16:01):    No growth to date.      CARDIAC MARKERS ( 28 Mar 2018 19:14 )  x     / <0.01 ng/mL / 311 U/L / x     / 1.7 ng/mL  CARDIAC MARKERS ( 28 Mar 2018 09:39 )  x     / <0.01 ng/mL / 360 U/L / x     / 2.0 ng/mL      Troponin T, Serum: <0.01 ng/mL (03-28-18 @ 19:14)      RADIOLOGY:  CT chest wcontrast: 1. No pulmonary embolism.  2. Right heart enlargement with findings compatible with dysfunction.3. Multi segmental bilateral lower lobe atelectasis/consolidation.    CXR(3/28): Improving bibasilar opacities. No effusions.    EKG(3/28): . Atrial flutter with 2:1 A-V conduction. ST & T wave abnormality, consider anterolateral ischemia    blood cx(3/26,27) (-) Ucx(3/27): normal angie      PHYSICAL EXAM:  GEN: NAD  LUNGS: Clear to auscultation bilaterally.   HEART: +s1s2 RRR.   ABD: Soft, NT/ND. (+) bs  EXT: no c/c/e. 2+PP, BOONE  NEURO: AAOX3. No focal defecits

## 2018-03-29 NOTE — SWALLOW BEDSIDE ASSESSMENT ADULT - SWALLOW EVAL: RECOMMENDED FEEDING/EATING TECHNIQUES
small sips/bites
small sips/bites/only feed pt when awake and alert
small sips/bites
position upright (90 degrees)/small sips/bites/allow for swallow between intakes

## 2018-03-29 NOTE — PROGRESS NOTE ADULT - ASSESSMENT
# new onset AF- now converted to sinus  - continue with telemetry while on amio gtt---once completes 18 hr would dc amio gtt and transition to amiodarone 400 mg orally bid  -f/u echo  -CE x 2 negative  -appreciate cardioogy recomm  -doppler negative for dvt  -CT chest negative for PE but did show bilateral atelectasis---recomm incentive spirometry for atelectasis  -cont ASA (Chadsvasc 1--->no AC)    # Sepsis--unclear etiology---h/o prior ecoli uti  -f/u with bcx and Ucx final  -cont abx (iv cefepime/vanco_)  -monitor wbc/fever curve---check labs today (wbc yesterday 18Kl)  -ID consult      # mechanical fall--L hip fx- pod#4 from L suzi  - cont DVT ppx  - resume PT  -f/u with ortho   -pain managment     # Parkinson's disease - cont home meds.    # DVT/GI ppx    DNR/DNI

## 2018-03-29 NOTE — DIETITIAN INITIAL EVALUATION ADULT. - ENERGY NEEDS
Calories: 0782-3884 kcals/day (MSJ x 1.2-1.3)  Protein:74-89 g/day (1-1.2 g/kg ABW)  Fluids: 1ml/kcal

## 2018-03-30 ENCOUNTER — TRANSCRIPTION ENCOUNTER (OUTPATIENT)
Age: 73
End: 2018-03-30

## 2018-03-30 LAB
ANION GAP SERPL CALC-SCNC: 11 MMOL/L — SIGNIFICANT CHANGE UP (ref 7–14)
BASOPHILS # BLD AUTO: 0.05 K/UL — SIGNIFICANT CHANGE UP (ref 0–0.2)
BASOPHILS NFR BLD AUTO: 0.5 % — SIGNIFICANT CHANGE UP (ref 0–1)
BUN SERPL-MCNC: 12 MG/DL — SIGNIFICANT CHANGE UP (ref 10–20)
CALCIUM SERPL-MCNC: 7.3 MG/DL — LOW (ref 8.5–10.1)
CHLORIDE SERPL-SCNC: 101 MMOL/L — SIGNIFICANT CHANGE UP (ref 98–110)
CO2 SERPL-SCNC: 25 MMOL/L — SIGNIFICANT CHANGE UP (ref 17–32)
CREAT SERPL-MCNC: <0.5 MG/DL — LOW (ref 0.7–1.5)
EOSINOPHIL # BLD AUTO: 0.23 K/UL — SIGNIFICANT CHANGE UP (ref 0–0.7)
EOSINOPHIL NFR BLD AUTO: 2.3 % — SIGNIFICANT CHANGE UP (ref 0–8)
GLUCOSE SERPL-MCNC: 119 MG/DL — HIGH (ref 70–99)
HCT VFR BLD CALC: 26.7 % — LOW (ref 37–47)
HGB BLD-MCNC: 8.9 G/DL — LOW (ref 12–16)
IMM GRANULOCYTES NFR BLD AUTO: 0.7 % — HIGH (ref 0.1–0.3)
LYMPHOCYTES # BLD AUTO: 0.87 K/UL — LOW (ref 1.2–3.4)
LYMPHOCYTES # BLD AUTO: 8.7 % — LOW (ref 20.5–51.1)
MAGNESIUM SERPL-MCNC: 2 MG/DL — SIGNIFICANT CHANGE UP (ref 1.8–2.4)
MCHC RBC-ENTMCNC: 31.2 PG — HIGH (ref 27–31)
MCHC RBC-ENTMCNC: 33.3 G/DL — SIGNIFICANT CHANGE UP (ref 32–37)
MCV RBC AUTO: 93.7 FL — SIGNIFICANT CHANGE UP (ref 81–99)
MONOCYTES # BLD AUTO: 1.07 K/UL — HIGH (ref 0.1–0.6)
MONOCYTES NFR BLD AUTO: 10.6 % — HIGH (ref 1.7–9.3)
NEUTROPHILS # BLD AUTO: 7.76 K/UL — HIGH (ref 1.4–6.5)
NEUTROPHILS NFR BLD AUTO: 77.2 % — HIGH (ref 42.2–75.2)
NRBC # BLD: 0 /100 WBCS — SIGNIFICANT CHANGE UP (ref 0–0)
PLATELET # BLD AUTO: 269 K/UL — SIGNIFICANT CHANGE UP (ref 130–400)
POTASSIUM SERPL-MCNC: 3.5 MMOL/L — SIGNIFICANT CHANGE UP (ref 3.5–5)
POTASSIUM SERPL-SCNC: 3.5 MMOL/L — SIGNIFICANT CHANGE UP (ref 3.5–5)
RBC # BLD: 2.85 M/UL — LOW (ref 4.2–5.4)
RBC # FLD: 13.2 % — SIGNIFICANT CHANGE UP (ref 11.5–14.5)
SODIUM SERPL-SCNC: 137 MMOL/L — SIGNIFICANT CHANGE UP (ref 135–146)
WBC # BLD: 10.05 K/UL — SIGNIFICANT CHANGE UP (ref 4.8–10.8)
WBC # FLD AUTO: 10.05 K/UL — SIGNIFICANT CHANGE UP (ref 4.8–10.8)

## 2018-03-30 RX ORDER — DIVALPROEX SODIUM 500 MG/1
2 TABLET, DELAYED RELEASE ORAL
Qty: 0 | Refills: 0 | COMMUNITY
Start: 2018-03-30

## 2018-03-30 RX ORDER — DIVALPROEX SODIUM 500 MG/1
2 TABLET, DELAYED RELEASE ORAL
Qty: 0 | Refills: 0 | COMMUNITY

## 2018-03-30 RX ORDER — RIVASTIGMINE 4.6 MG/24H
1 PATCH, EXTENDED RELEASE TRANSDERMAL
Qty: 0 | Refills: 0 | COMMUNITY
Start: 2018-03-30

## 2018-03-30 RX ORDER — AMIODARONE HYDROCHLORIDE 400 MG/1
2 TABLET ORAL
Qty: 0 | Refills: 0 | COMMUNITY
Start: 2018-03-30

## 2018-03-30 RX ORDER — SENNA PLUS 8.6 MG/1
2 TABLET ORAL
Qty: 0 | Refills: 0 | COMMUNITY
Start: 2018-03-30

## 2018-03-30 RX ORDER — ACETAMINOPHEN 500 MG
650 TABLET ORAL ONCE
Qty: 0 | Refills: 0 | Status: COMPLETED | OUTPATIENT
Start: 2018-03-30 | End: 2018-03-30

## 2018-03-30 RX ORDER — AMIODARONE HYDROCHLORIDE 400 MG/1
1 TABLET ORAL
Qty: 10 | Refills: 0 | OUTPATIENT
Start: 2018-03-30 | End: 2018-04-03

## 2018-03-30 RX ORDER — RIVASTIGMINE 4.6 MG/24H
1 PATCH, EXTENDED RELEASE TRANSDERMAL
Qty: 0 | Refills: 0 | COMMUNITY

## 2018-03-30 RX ORDER — LACTULOSE 10 G/15ML
22.5 SOLUTION ORAL
Qty: 0 | Refills: 0 | COMMUNITY
Start: 2018-03-30

## 2018-03-30 RX ORDER — ACETAMINOPHEN WITH CODEINE 300MG-30MG
1 TABLET ORAL
Qty: 0 | Refills: 0 | COMMUNITY
Start: 2018-03-30

## 2018-03-30 RX ORDER — CLONAZEPAM 1 MG
0.12 TABLET ORAL AT BEDTIME
Qty: 0 | Refills: 0 | Status: DISCONTINUED | OUTPATIENT
Start: 2018-03-30 | End: 2018-03-31

## 2018-03-30 RX ORDER — DOCUSATE SODIUM 100 MG
1 CAPSULE ORAL
Qty: 0 | Refills: 0 | COMMUNITY
Start: 2018-03-30

## 2018-03-30 RX ADMIN — ENOXAPARIN SODIUM 60 MILLIGRAM(S): 100 INJECTION SUBCUTANEOUS at 17:30

## 2018-03-30 RX ADMIN — DIVALPROEX SODIUM 250 MILLIGRAM(S): 500 TABLET, DELAYED RELEASE ORAL at 17:29

## 2018-03-30 RX ADMIN — SODIUM CHLORIDE 3 MILLILITER(S): 9 INJECTION INTRAMUSCULAR; INTRAVENOUS; SUBCUTANEOUS at 13:03

## 2018-03-30 RX ADMIN — DIVALPROEX SODIUM 250 MILLIGRAM(S): 500 TABLET, DELAYED RELEASE ORAL at 05:27

## 2018-03-30 RX ADMIN — AMIODARONE HYDROCHLORIDE 400 MILLIGRAM(S): 400 TABLET ORAL at 17:29

## 2018-03-30 RX ADMIN — Medication 650 MILLIGRAM(S): at 21:51

## 2018-03-30 RX ADMIN — RIVASTIGMINE 1 PATCH: 4.6 PATCH, EXTENDED RELEASE TRANSDERMAL at 05:25

## 2018-03-30 RX ADMIN — Medication 250 MILLIGRAM(S): at 06:25

## 2018-03-30 RX ADMIN — CEFEPIME 100 MILLIGRAM(S): 1 INJECTION, POWDER, FOR SOLUTION INTRAMUSCULAR; INTRAVENOUS at 05:25

## 2018-03-30 RX ADMIN — ENOXAPARIN SODIUM 60 MILLIGRAM(S): 100 INJECTION SUBCUTANEOUS at 05:28

## 2018-03-30 RX ADMIN — LACTULOSE 15 GRAM(S): 10 SOLUTION ORAL at 06:30

## 2018-03-30 RX ADMIN — SODIUM CHLORIDE 3 MILLILITER(S): 9 INJECTION INTRAMUSCULAR; INTRAVENOUS; SUBCUTANEOUS at 09:14

## 2018-03-30 RX ADMIN — Medication 0.12 MILLIGRAM(S): at 21:51

## 2018-03-30 RX ADMIN — SODIUM CHLORIDE 3 MILLILITER(S): 9 INJECTION INTRAMUSCULAR; INTRAVENOUS; SUBCUTANEOUS at 21:44

## 2018-03-30 RX ADMIN — AMIODARONE HYDROCHLORIDE 400 MILLIGRAM(S): 400 TABLET ORAL at 06:32

## 2018-03-30 NOTE — PROGRESS NOTE ADULT - ASSESSMENT
A/P: 73F POD#5 s/p left suzi arthroplasty  - Pain control  - DVT PPX  - IS  - Encourage ambulation, PT, OT, OOBTC  - Antibiotic and medical management per primary team  - Dispo planning once medically stable

## 2018-03-30 NOTE — DISCHARGE NOTE ADULT - HOSPITAL COURSE
74 y/o F,  at baseline ambulatory with assistance and nonverbal due to alzheimers dementia, w pmh parkinson, recurrent UTIs and glaucomapresented s/p mechanical fall onto knees at home while trying to get up from couch,  without any head trauma or LOC. Found to have L femoral neck deformity and exhibiting pain response to being moved/adjusted.  Patient had hip fx repair and then had afib w rvr, started on amioderone drip. Patient stabilized and on PO amio 400mg BID currently.

## 2018-03-30 NOTE — PROGRESS NOTE ADULT - ASSESSMENT
1. PAF  - continue Amiodarone 400 mg po q12h for one week, then 200 mg po q 24h.  -f/u echo  -CE x 2 negative  -appreciate cardioogy recomm  -doppler negative for dvt  -CT chest negative for PE but did show bilateral atelectasis---recomm incentive spirometry for atelectasis  -cont ASA (Chadsvasc 1--->no AC)    2.  SIRS (NO SEPSIS)  - ID F/U noted. D/C all abx.    3.  Mechanical fall--L hip fx- pod#5 from L suzi  - cont DVT ppx  - resume PT  -f/u with ortho   -pain managment     * Med rec reviewed. Time spent 44 minutes.  * Can D/C to SNF>    4.  Parkinson's disease - cont home meds.    5. Advanced dementia  -Cont Rivastigmine    # DVT/GI ppx    DNR/DNI

## 2018-03-30 NOTE — DISCHARGE NOTE ADULT - MEDICATION SUMMARY - MEDICATIONS TO TAKE
I will START or STAY ON the medications listed below when I get home from the hospital:    acetaminophen-codeine 300 mg-30 mg oral tablet  -- 1 tab(s) by mouth every 4 hours, As needed, Moderate Pain (4 - 6)  -- Indication: For Pain    amiodarone 200 mg oral tablet  -- 2 tab(s) by mouth 2 times a day after taking 400mg twice a day for 5 days.  -- Indication: For Afib    clonazePAM 0.25 mg oral tablet, disintegrating  -- orally once a day  -- Indication: For Alzheimer disease    divalproex sodium 125 mg oral delayed release capsule  -- 2 cap(s) by mouth 2 times a day  -- Indication: For Alzheimer disease    rivastigmine 13.3 mg/24 hr transdermal film, extended release  -- 1 patch by transdermal patch every 24 hours  -- Indication: For Alzheimer disease    docusate sodium 100 mg oral capsule  -- 1 cap(s) by mouth 3 times a day, As needed, Constipation  -- Indication: For Constipation    lactulose 10 g/15 mL oral syrup  -- 22.5 milliliter(s) by mouth 3 times a day, As needed, constipation  -- Indication: For Constipation    senna oral tablet  -- 2 tab(s) by mouth once a day (at bedtime), As needed, Constipation  -- Indication: For Constipation    Nuedexta 20 mg-10 mg oral capsule  -- 1 cap(s) by mouth every 12 hours  -- Indication: For Parkinson disease

## 2018-03-30 NOTE — DISCHARGE NOTE ADULT - PATIENT PORTAL LINK FT
You can access the [facility] Patient Portal, offered by [facility], by registering with the following website: [email or web address]

## 2018-03-30 NOTE — DISCHARGE NOTE ADULT - PLAN OF CARE
prevent recurrence -continue with amioderone. -continue with amioderone 400mg twice a day for 5 days then 200mg twice a day.

## 2018-03-30 NOTE — PROGRESS NOTE ADULT - ASSESSMENT
New onset A. Fib with RVR  -c/w tele  -c/w amiodarone drip per cardio and then start PO 400mg q12h  - cardiac enzymes(-)x2  - f/u 2D echo   -LE duplex (-)  - pt had run if 12 beats of VT in AM. Asymptomatic, vitally stable.    Sepsis  - NS 1 L IV bolus x3 given  -c/w cefepime and vancomycin  - CXR(3/28): Improving bibasilar opacities. No effusions  -blood cx(3/26) NGTD x2  urine cx: normal angie  -f/u ID    Traumatic Left hip fracture, s/p HAP 3/25  - c/w DVT ppx  - f/u PT    Parkinson's disease   -c/w home meds.    Patient is DNR/DNI    Dispo  -STR on d/c New onset A. Fib with RVR  -c/w tele  -c/w amiodarone drip per cardio and then start PO 400mg q12h  - cardiac enzymes(-)x2  - f/u 2D echo   -LE duplex (-)  - pt had run if 12 beats of VT in AM. Asymptomatic, vitally stable.    Sepsis  - NS 1 L IV bolus x3 given  -c/w cefepime and vancomycin  - CXR(3/28): Improving bibasilar opacities. No effusions  -blood cx(3/26) NGTD x2  urine cx: normal angie  -ID: high liklihood of aspiration w pneumonitis. Hold off on abx for now    Traumatic Left hip fracture, s/p HAP 3/25  - c/w DVT ppx  - f/u PT    Parkinson's disease   -c/w home meds.    Patient is DNR/DNI    Dispo  -STR on d/c

## 2018-03-30 NOTE — DISCHARGE NOTE ADULT - CARE PLAN
Goal:	prevent recurrence  Assessment and plan of treatment:	-continue with amioderone. Goal:	prevent recurrence  Assessment and plan of treatment:	-continue with amioderone 400mg twice a day for 5 days then 200mg twice a day. Principal Discharge DX:	Paroxysmal atrial fibrillation  Goal:	prevent recurrence  Assessment and plan of treatment:	-continue with amioderone 400mg twice a day for 5 days then 200mg twice a day.

## 2018-03-30 NOTE — PROGRESS NOTE ADULT - SUBJECTIVE AND OBJECTIVE BOX
ELSY ISSA  73y Female    INTERVAL HPI/OVERNIGHT EVENTS:  Patient seen and examined. Lying comfortably in a bed. Back to NSR. No fever    ROS: All other systems are negative.    Vital Signs:    T(F): 99.7 (03-30-18 @ 14:25), Max: 99.7 (03-29-18 @ 20:18)  HR: 81 (03-30-18 @ 14:25) (78 - 81)  BP: 105/52 (03-30-18 @ 14:25) (101/47 - 106/57)  RR: 18 (03-30-18 @ 14:25) (18 - 18)  SpO2: --  I&O's Summary    29 Mar 2018 07:01  -  30 Mar 2018 07:00  --------------------------------------------------------  IN: 1410 mL / OUT: 200 mL / NET: 1210 mL    30 Mar 2018 07:01  -  30 Mar 2018 15:54  --------------------------------------------------------  IN: 680 mL / OUT: 3 mL / NET: 677 mL      Daily     Daily   CAPILLARY BLOOD GLUCOSE          PHYSICAL EXAM:    GENERAL: Non verbal. NAD  SKIN: No rashes or lesions  HENT: Atrumatic. Normocephalic. PERRL. Moist membranes.  NECK: Supple, No JVD. No lymphadenopathy.  PULMONARY: CTA B/L. No wheezing. No rales  CVS: Normal S1, S2. Rate and Rythm are regular. No murmurs, rubs or gallops.  ABDOMEN: Soft, Nontender, Nondistended; Bowel sounds present  EXTREMITIES: Peripheral pulses intact. No edema B/L.    Consultant(s) Notes Reviewed:  [x ] YES  [ ] NO  Care Discussed with Consultants/Other Providers [ x] YES  [ ] NO    EKG reviewed  Telemetry reviewed    LABS:                        8.9    10.05 )-----------( 269      ( 30 Mar 2018 11:03 )             26.7     03-30    137  |  101  |  12  ----------------------------<  119<H>  3.5   |  25  |  <0.5<L>    Ca    7.3<L>      30 Mar 2018 11:03  Mg     2.0     03-30        Trop --, CKMB TNP, , 03-28-18 @ 19:14  Trop --, CKMB --, , 03-28-18 @ 09:39      Culture - Blood (collected 28 Mar 2018 11:49)  Source: .Blood None  Preliminary Report (30 Mar 2018 01:01):    No growth to date.        RADIOLOGY & ADDITIONAL TESTS:      Imaging or report Personally Reviewed:  [ ] YES  [ ] NO    Medications:  Standing  amiodarone    Tablet 400 milliGRAM(s) Oral two times a day  clonazePAM Tablet 0.125 milliGRAM(s) Oral at bedtime  dextromethorphan 20 mG/quiNIDine sulfate 10 mG 1 Capsule(s) Oral every 12 hours  diVALproex Sprinkle 250 milliGRAM(s) Oral two times a day  enoxaparin Injectable 60 milliGRAM(s) SubCutaneous two times a day  rivastigmine patch 13.3 mG/24 Hr(s) 1 Patch Transdermal every 24 hours  sodium chloride 0.9% lock flush 3 milliLiter(s) IV Push every 8 hours    PRN Meds  acetaminophen  Suppository 650 milliGRAM(s) Rectal every 6 hours PRN  acetaminophen 300 mG/codeine 30 mG 1 Tablet(s) Oral every 4 hours PRN  docusate sodium 100 milliGRAM(s) Oral three times a day PRN  lactulose Syrup 15 Gram(s) Oral three times a day PRN  morphine  - Injectable 2 milliGRAM(s) IV Push every 3 hours PRN  senna 2 Tablet(s) Oral at bedtime PRN      Case discussed with resident    Care discussed with pt/family

## 2018-03-30 NOTE — CONSULT NOTE ADULT - ASSESSMENT
Clinically no evidence of a bacterial PNA.  High probability of aspiration with pneumonitis .  CT with atelectatic changes at bases.  Will not treat as bacterial pneumonia as pts history on presentation also not consistent with a pneumonia.    No yancey catheter. Asymptomatic bacteruria with E. coi  Hold antibiotics for now.

## 2018-03-30 NOTE — CONSULT NOTE ADULT - SUBJECTIVE AND OBJECTIVE BOX
73F nonverbal at baseline st/p fall sustaining left hip fracture. The patient has a history of Alzheimers and Parkinsons with shuffling gait, ambulates with assistance of her aid, fell directly onto her hip while getting up from the couch. Patient transferred from [place] for operative intervention. The patient's family interviewed at bedside about her current condition.     PMHx:  Parkinsons, Alzheimers, Dementia    PSx:  Denies    ALL :   NKDA    Social Hx:  Lives with  at home, nonverbal at baseline, ambulates with the assistance of 1-2 people, cannot negotiate a walker or cane because of baseline condition    PE :  LLE  No open skin or wounds  Leg short and externally rotated  Withdraws to pain with log roll and axial loading  Thigh and calf soft and compressible  Unable to obtain motor and sensory exam, but spontaneously moving extremity  Foot warm and perfused    Imaging reviewed: Left Femoral Neck Fracture
73y F with MHx of parkinson's disease with dementia, frequent UTI, glaucoma, presented s/p mechanical fall at home found to have left hip fracture and now s/p Left hemiarthroplasty on 03/25, tolerated surgery well. Post op course complicated by fever, work up was positive for E. Coli UTI with ?PNA vs. atelectasis. Pt was started on IV antibiotics. Pt found today to have heart rate of 179, irregular, and Afib was seen on ecg. HR then became 150 and atrial flutter was seen on bedside monitor, not corrected with iv cardizem and metoprolol pushes. Pt also found to be hypoxic (88% on RA) and hypotensive (80/50).    PAST MEDICAL & SURGICAL HISTORY:  Glaucoma  Frequent UTI  Parkinson disease  Alzheimer disease  No significant past surgical history      MEDICATIONS  (STANDING):  amiodarone Infusion 1 mG/Min (33.333 mL/Hr) IV Continuous <Continuous>  amiodarone Infusion 0.5 mG/Min (16.667 mL/Hr) IV Continuous <Continuous>  amiodarone IVPB 150 milliGRAM(s) IV Intermittent once  cefepime  IVPB      clonazepam 0.125 mg ODT 1 Tablet(s) 1 Tablet(s) Oral daily  dextromethorphan 20 mG/quiNIDine sulfate 10 mG 1 Capsule(s) Oral every 12 hours  diVALproex Sprinkle 250 milliGRAM(s) Oral two times a day  enoxaparin Injectable 60 milliGRAM(s) SubCutaneous two times a day  rivastigmine patch 13.3 mG/24 Hr(s) 1 Patch Transdermal every 24 hours  sodium chloride 0.9% Bolus 1000 milliLiter(s) IV Bolus once  sodium chloride 0.9% lock flush 3 milliLiter(s) IV Push every 8 hours  vancomycin  IVPB        MEDICATIONS  (PRN):  acetaminophen  Suppository 650 milliGRAM(s) Rectal every 6 hours PRN For Temp greater than 38.5 C (101.3 F)  acetaminophen 300 mG/codeine 30 mG 1 Tablet(s) Oral every 4 hours PRN Moderate Pain (4 - 6)  docusate sodium 100 milliGRAM(s) Oral three times a day PRN Constipation  morphine  - Injectable 2 milliGRAM(s) IV Push every 3 hours PRN Moderate Pain (4 - 6)  senna 2 Tablet(s) Oral at bedtime PRN Constipation    I&O's Summary    27 Mar 2018 07:01  -  28 Mar 2018 07:00  --------------------------------------------------------  IN: 0 mL / OUT: 1 mL / NET: -1 mL        Physical Exam:  T(F): 99.6, Max: 102.7 (03-27-18 @ 18:39)  HR: 158  BP: 114/61  RR: 18  SpO2: 98%  General: WNWD, NAD  HEENT: Neck supple, no lymphadenopathy, PERRLA, EOMI  Heart: RRR, S1, S2 noted, no murmurs, rubs, gallops  Lungs: CTA b/l, no wheezes, rales, rhonchi   Abdomen: soft, non tender, non distended, + bowel sounds  Extremities: no C/C/E, full ROM in all extremities  Neuro: A&O x 3, no focal deficits  Skin: No rashes    Labs:                        10.7   18.13 )-----------( 228      ( 28 Mar 2018 08:05 )             31.2             03-28    133<L>  |  97<L>  |  13  ----------------------------<  116<H>  4.0   |  27  |  0.5<L>    Ca    7.9<L>      28 Mar 2018 08:05                CARDIAC MARKERS ( 28 Mar 2018 09:39 )  x     / <0.01 ng/mL / 360 U/L / x     / 2.0 ng/mL        Urinalysis Basic - ( 27 Mar 2018 03:12 )    Color: Dark Yellow / Appearance: Clear / SG: >=1.030 / pH: x  Gluc: x / Ketone: Negative  / Bili: Negative / Urobili: 1.0   Blood: x / Protein: 30 / Nitrite: Negative   Leuk Esterase: Negative / RBC: x / WBC x   Sq Epi: x / Non Sq Epi: Occasional /HPF / Bacteria: x        Culture - Blood (collected 26 Mar 2018 17:10)  Source: .Blood None  Preliminary Report (27 Mar 2018 23:01):    No growth to date.    Culture - Blood (collected 26 Mar 2018 11:39)  Source: .Blood None  Preliminary Report (27 Mar 2018 16:01):    No growth to date.    < from: 12 Lead ECG (03.28.18 @ 07:51) >  Diagnosis Line Atrial fibrillation with rapid ventricular response  Marked ST abnormality, possible inferior subendocardial injury  Abnormal ECG
HPI:  73y F,  at baseline ambulatory with assistance, w pmh listed below presented s/p mechanical fall at home.       Pt lives at home with aid.  Per aid,  pt fell onto her knees after trying to get up from a seated position on the couch.  Pt did not have any trauma to her head, loss of consciousness.  Family states that patient does often tend to get up and sit down often from being restless on her parkinson medications, but this is the only fall she's had over past year.     Unable to ask if patient felt any lightheadedness / weakness / palpitations prior to fall because patient is nonverbal at baseline due to underlying dementia. (23 Mar 2018 20:52)      PAST MEDICAL & SURGICAL HISTORY:  Glaucoma  Frequent UTI  Parkinson disease  Alzheimer disease  No significant past surgical history      Hospital Course: Xray showed left hip femoral neck fx, s/p hemiarthroplasty on 3/25, wbat per or    TODAY'S SUBJECTIVE & REVIEW OF SYMPTOMS:     Constitutional WNL   Cardio WNL   Resp WNL   GI WNL  Heme WNL  Endo WNL  Skin WNL  MSK WNL  Neuro non verbal  Cognitive confused       MEDICATIONS  (STANDING):  ceFAZolin   IVPB 1000 milliGRAM(s) IV Intermittent every 8 hours  clonazepam 0.125 mg ODT 1 Tablet(s) 1 Tablet(s) Oral daily  dextromethorphan 20 mG/quiNIDine sulfate 10 mG 1 Capsule(s) Oral every 12 hours  diVALproex Sprinkle 250 milliGRAM(s) Oral two times a day  heparin  Injectable 5000 Unit(s) SubCutaneous every 8 hours  rivastigmine patch 13.3 mG/24 Hr(s) 1 Patch Transdermal every 24 hours  sodium chloride 0.9% lock flush 3 milliLiter(s) IV Push every 8 hours  sodium chloride 0.9%. 1000 milliLiter(s) (50 mL/Hr) IV Continuous <Continuous>    MEDICATIONS  (PRN):  acetaminophen  Suppository 650 milliGRAM(s) Rectal every 6 hours PRN For Temp greater than 38.5 C (101.3 F)  acetaminophen 300 mG/codeine 30 mG 1 Tablet(s) Oral every 4 hours PRN Moderate Pain (4 - 6)  docusate sodium 100 milliGRAM(s) Oral three times a day PRN Constipation  morphine  - Injectable 2 milliGRAM(s) IV Push every 3 hours PRN Moderate Pain (4 - 6)  senna 2 Tablet(s) Oral at bedtime PRN Constipation      FAMILY HISTORY:  No pertinent family history in first degree relatives      Allergies    No Known Allergies    Intolerances        SOCIAL HISTORY:    [  ] Etoh  [  ] Smoking  [  ] Substance abuse     Home Environment:  [  ] Home Alone  [ x ] Lives with Family  [  ] Home Health Aid    Dwelling:  [  ] Apartment  [ x ] Private House  [  ] Adult Home  [  ] Skilled Nursing Facility      [  ] Short Term  [  ] Long Term  [ x ] Stairs       Elevator [  ]    FUNCTIONAL STATUS PTA: (Check all that apply)  Ambulation: [   ]Independent    [x  ] Dependent     [  ] Non-Ambulatory  Assistive Device: [  ] SA Cane  [  ]  Q Cane  [  ] Walker  [  ]  Wheelchair  ADL : [  ] Independent  [x  ]  Dependent       Vital Signs Last 24 Hrs  T(C): 37.4 (26 Mar 2018 07:24), Max: 38.2 (26 Mar 2018 04:00)  T(F): 99.3 (26 Mar 2018 07:24), Max: 100.7 (26 Mar 2018 04:00)  HR: 96 (26 Mar 2018 07:24) (68 - 106)  BP: 123/61 (26 Mar 2018 07:24) (121/63 - 195/86)  BP(mean): 86 (25 Mar 2018 16:27) (86 - 86)  RR: 18 (26 Mar 2018 07:24) (12 - 21)  SpO2: 97% (25 Mar 2018 17:42) (94% - 98%)      PHYSICAL EXAM: confused  GENERAL: NAD, well-groomed, well-developed  HEAD:  Atraumatic, Normocephalic  EYES: EOMI, PERRLA, conjunctiva and sclera clear  NECK: Supple, No JVD, Normal thyroid  CHEST/LUNG: Clear   HEART: Regular   ABDOMEN: Soft  EXTREMITIES:  no calf tenderness    NERVOUS SYSTEM:  Cranial Nerves 2-12 intact [  ] Abnormal  [  ]  ROM: WFL all extremities [  ]  Abnormal [ x ] limited lle  Motor Strength: limited lle  Sensation: intact to light touch [  ] Abnormal [  ]  Reflexes: Symmetric [  ]  Abnormal [  ]    FUNCTIONAL STATUS:  Bed Mobility: Independent [  ]  Supervision [  ]  Needs Assistance [x  ]  N/A [  ]  Transfers: Independent [  ]  Supervision [  ]  Needs Assistance [x  ]  N/A [  ]   Ambulation: Independent [  ]  Supervision [  ]  Needs Assistance [  ]  N/A [  ]  ADL: Independent [  ] Requires Assistance [  ] N/A [  ]      LABS:                        12.4   12.43 )-----------( 224      ( 26 Mar 2018 05:54 )             36.7     03-    137  |  96<L>  |  10  ----------------------------<  123<H>  4.2   |  29  |  0.7    Ca    8.4<L>      26 Mar 2018 05:54        Urinalysis Basic - ( 26 Mar 2018 08:16 )    Color: Yellow / Appearance: Cloudy / S.025 / pH: x  Gluc: x / Ketone: Negative  / Bili: Negative / Urobili: 1.0 mg/dL   Blood: x / Protein: Trace mg/dL / Nitrite: Negative   Leuk Esterase: Moderate / RBC: 5-10 /HPF / WBC 10-25 /HPF   Sq Epi: x / Non Sq Epi: Few /HPF / Bacteria: x        RADIOLOGY & ADDITIONAL STUDIES:    Assesment:
ELSY ISSA  73y, Female  Allergy: No Known Allergies      HPI:  73y F,  at baseline ambulatory with assistance, w pmh listed below presented s/p mechanical fall at home.       Pt lives at home with aid.  Per aid,  pt fell onto her knees after trying to get up from a seated position on the couch.  Pt did not have any trauma to her head, loss of consciousness.  Family states that patient does often tend to get up and sit down often from being restless on her parkinson medications, but this is the only fall she's had over past year.     Unable to ask if patient felt any lightheadedness / weakness / palpitations prior to fall because patient is nonverbal at baseline due to underlying dementia. (23 Mar 2018 20:52)    FAMILY HISTORY:  No pertinent family history in first degree relatives    PAST MEDICAL & SURGICAL HISTORY:  Glaucoma  Frequent UTI  Parkinson disease  Alzheimer disease  No significant past surgical history        VITALS:  T(F): 99.3, Max: 99.7 (03-29-18 @ 20:18)  HR: 78  BP: 106/57  RR: 18Vital Signs Last 24 Hrs  T(C): 37.4 (30 Mar 2018 05:43), Max: 37.6 (29 Mar 2018 20:18)  T(F): 99.3 (30 Mar 2018 05:43), Max: 99.7 (29 Mar 2018 20:18)  HR: 78 (30 Mar 2018 05:43) (72 - 78)  BP: 106/57 (30 Mar 2018 05:43) (95/49 - 106/57)  BP(mean): --  RR: 18 (30 Mar 2018 05:43) (18 - 18)  SpO2: --    TESTS & MEASUREMENTS:      Mg     2.1     03-28          Culture - Blood (collected 03-28-18 @ 11:49)  Source: .Blood None  Preliminary Report (03-30-18 @ 01:01):    No growth to date.    Culture - Blood (collected 03-27-18 @ 05:32)  Source: .Blood None  Preliminary Report (03-28-18 @ 15:05):    No growth to date.    Culture - Urine (collected 03-27-18 @ 03:12)  Source: .Urine Catheterized  Final Report (03-28-18 @ 14:04):    <10,000 CFU/ml    Normal Urogenital angie present    Culture - Blood (collected 03-26-18 @ 17:10)  Source: .Blood None  Preliminary Report (03-27-18 @ 23:01):    No growth to date.    Culture - Blood (collected 03-26-18 @ 11:39)  Source: .Blood None  Preliminary Report (03-27-18 @ 16:01):    No growth to date.    Culture - Urine (collected 03-24-18 @ 06:40)  Source: .Urine Catheterized  Final Report (03-26-18 @ 17:43):    50,000 - 99,000 CFU/mL Escherichia coli    Normal Urogenital angie present  Organism: Escherichia coli (03-26-18 @ 17:43)  Organism: Escherichia coli (03-26-18 @ 17:43)      -  Amikacin: S <=8      -  Amoxicillin/Clavulanic Acid: S <=8/4      -  Ampicillin: S 4      -  Ampicillin/Sulbactam: S <=4/2      -  Aztreonam: S <=4      -  Cefazolin: S <=2      -  Cefepime: S <=2      -  Cefoxitin: S <=4      -  Ceftriaxone: S <=1      -  Ciprofloxacin: S <=0.5      -  Ertapenem: S <=0.5      -  Gentamicin: S <=1      -  Imipenem: S <=1      -  Levofloxacin: S <=1      -  Meropenem: S <=1      -  Nitrofurantoin: S <=32      -  Piperacillin/Tazobactam: S <=8      -  Tigecycline: S <=1      -  Tobramycin: S <=2      -  Trimethoprim/Sulfamethoxazole: S <=0.5/9.5      Method Type: GEMA    Culture - Blood (collected 03-24-18 @ 06:09)  Source: .Blood None  Final Report (03-29-18 @ 17:00):    No growth at 5 days.            RADIOLOGY & ADDITIONAL TESTS:    ANTIBIOTICS:  cefepime  IVPB      cefepime  IVPB 1000 milliGRAM(s) IV Intermittent every 8 hours  vancomycin  IVPB      vancomycin  IVPB 1000 milliGRAM(s) IV Intermittent every 12 hours

## 2018-03-30 NOTE — PROGRESS NOTE ADULT - SUBJECTIVE AND OBJECTIVE BOX
SUBJECTIVE:    Patient is a 73y old Female who presents with a chief complaint of sp fall (23 Mar 2018 20:52)    Currently admitted to medicine with the primary diagnosis of Hip fracture     Today is hospital day 7d. This morning she is resting comfortably in bed and reports no new issues or overnight events.     PAST MEDICAL & SURGICAL HISTORY  Glaucoma  Frequent UTI  Parkinson disease  Alzheimer disease  No significant past surgical history    SOCIAL HISTORY:  Negative for smoking/alcohol/drug use.     ALLERGIES:  No Known Allergies    MEDICATIONS:  STANDING MEDICATIONS  amiodarone    Tablet 400 milliGRAM(s) Oral two times a day  cefepime  IVPB      cefepime  IVPB 1000 milliGRAM(s) IV Intermittent every 8 hours  clonazePAM Tablet 0.125 milliGRAM(s) Oral at bedtime  dextromethorphan 20 mG/quiNIDine sulfate 10 mG 1 Capsule(s) Oral every 12 hours  diVALproex Sprinkle 250 milliGRAM(s) Oral two times a day  enoxaparin Injectable 60 milliGRAM(s) SubCutaneous two times a day  rivastigmine patch 13.3 mG/24 Hr(s) 1 Patch Transdermal every 24 hours  sodium chloride 0.9% lock flush 3 milliLiter(s) IV Push every 8 hours  vancomycin  IVPB      vancomycin  IVPB 1000 milliGRAM(s) IV Intermittent every 12 hours    PRN MEDICATIONS  acetaminophen  Suppository 650 milliGRAM(s) Rectal every 6 hours PRN  acetaminophen 300 mG/codeine 30 mG 1 Tablet(s) Oral every 4 hours PRN  docusate sodium 100 milliGRAM(s) Oral three times a day PRN  lactulose Syrup 15 Gram(s) Oral three times a day PRN  morphine  - Injectable 2 milliGRAM(s) IV Push every 3 hours PRN  senna 2 Tablet(s) Oral at bedtime PRN    VITALS:   T(F): 99.3  HR: 78  BP: 106/57  RR: 18  SpO2: --    LABS:      Mg     2.1     03-28    Culture - Blood (collected 28 Mar 2018 11:49)  Source: .Blood None  Preliminary Report (30 Mar 2018 01:01):    No growth to date.      CARDIAC MARKERS ( 28 Mar 2018 19:14 )  x     / <0.01 ng/mL / 311 U/L / x     / 1.7 ng/mL  CARDIAC MARKERS ( 28 Mar 2018 09:39 )  x     / <0.01 ng/mL / 360 U/L / x     / 2.0 ng/mL      Troponin T, Serum: <0.01 ng/mL (03-28-18 @ 19:14)  Troponin T, Serum: <0.01 ng/mL (03-28-18 @ 09:39)      RADIOLOGY:    CT chest wcontrast: 1. No pulmonary embolism.  2. Right heart enlargement with findings compatible with dysfunction.3. Multi segmental bilateral lower lobe atelectasis/consolidation.    CXR(3/28): Improving bibasilar opacities. No effusions.    EKG(3/28): . Atrial flutter with 2:1 A-V conduction. ST & T wave abnormality, consider anterolateral ischemia    blood cx(3/26,27) (-) Ucx(3/27): normal angie    PHYSICAL EXAM:  GEN: NAD  LUNGS: Clear to auscultation bilaterally.   HEART: +s1s2 RRR.   ABD: Soft, NT/ND. (+) bs  EXT: no c/c/e. 2+PP, BOONE  NEURO: AAOX3. No focal defecits

## 2018-03-30 NOTE — PROGRESS NOTE ADULT - SUBJECTIVE AND OBJECTIVE BOX
Patient seen and examined during AM rounds. Resting comfortably, no active complaints. Was transferred to [facility] on 3/28/18 after found to be in afib; doing well stable on amio drip. Per  patient got oob to chair with PT yesterday tolerated well. Patient being evaluated for possible infection met sirs criteria 2 days prior; WBC elevated on abx but no source.    Vital Signs Last 24 Hrs  T(C): 37.4 (30 Mar 2018 05:43), Max: 37.6 (29 Mar 2018 20:18)  T(F): 99.3 (30 Mar 2018 05:43), Max: 99.7 (29 Mar 2018 20:18)  HR: 78 (30 Mar 2018 05:43) (72 - 78)  BP: 106/57 (30 Mar 2018 05:43) (95/49 - 111/70)  BP(mean): --  RR: 18 (30 Mar 2018 05:43) (18 - 18)  SpO2: 95% (29 Mar 2018 08:29) (95% - 95%)    NAD  unlabored breathing RA  resting comfortably in bed  LLE  Dressings c/d/i  Thigh and calf soft and compressible  calf soft nontender  Motor and sensory exam unobtainable given baseline status  Foot warm and perfused                          10.7   18.13 )-----------( 228      ( 28 Mar 2018 08:05 )             31.2

## 2018-03-31 VITALS — WEIGHT: 147.71 LBS

## 2018-03-31 LAB
ANION GAP SERPL CALC-SCNC: 13 MMOL/L — SIGNIFICANT CHANGE UP (ref 7–14)
BASOPHILS # BLD AUTO: 0.05 K/UL — SIGNIFICANT CHANGE UP (ref 0–0.2)
BASOPHILS NFR BLD AUTO: 0.5 % — SIGNIFICANT CHANGE UP (ref 0–1)
BUN SERPL-MCNC: 11 MG/DL — SIGNIFICANT CHANGE UP (ref 10–20)
CALCIUM SERPL-MCNC: 7.7 MG/DL — LOW (ref 8.5–10.1)
CHLORIDE SERPL-SCNC: 102 MMOL/L — SIGNIFICANT CHANGE UP (ref 98–110)
CO2 SERPL-SCNC: 27 MMOL/L — SIGNIFICANT CHANGE UP (ref 17–32)
CREAT SERPL-MCNC: <0.5 MG/DL — LOW (ref 0.7–1.5)
CULTURE RESULTS: SIGNIFICANT CHANGE UP
CULTURE RESULTS: SIGNIFICANT CHANGE UP
EOSINOPHIL # BLD AUTO: 0.24 K/UL — SIGNIFICANT CHANGE UP (ref 0–0.7)
EOSINOPHIL NFR BLD AUTO: 2.5 % — SIGNIFICANT CHANGE UP (ref 0–8)
GLUCOSE SERPL-MCNC: 115 MG/DL — HIGH (ref 70–99)
HCT VFR BLD CALC: 26.6 % — LOW (ref 37–47)
HGB BLD-MCNC: 9 G/DL — LOW (ref 12–16)
IMM GRANULOCYTES NFR BLD AUTO: 1.1 % — HIGH (ref 0.1–0.3)
LYMPHOCYTES # BLD AUTO: 1.12 K/UL — LOW (ref 1.2–3.4)
LYMPHOCYTES # BLD AUTO: 11.5 % — LOW (ref 20.5–51.1)
MAGNESIUM SERPL-MCNC: 2.2 MG/DL — SIGNIFICANT CHANGE UP (ref 1.8–2.4)
MCHC RBC-ENTMCNC: 31.3 PG — HIGH (ref 27–31)
MCHC RBC-ENTMCNC: 33.8 G/DL — SIGNIFICANT CHANGE UP (ref 32–37)
MCV RBC AUTO: 92.4 FL — SIGNIFICANT CHANGE UP (ref 81–99)
MONOCYTES # BLD AUTO: 1.16 K/UL — HIGH (ref 0.1–0.6)
MONOCYTES NFR BLD AUTO: 11.9 % — HIGH (ref 1.7–9.3)
NEUTROPHILS # BLD AUTO: 7.1 K/UL — HIGH (ref 1.4–6.5)
NEUTROPHILS NFR BLD AUTO: 72.5 % — SIGNIFICANT CHANGE UP (ref 42.2–75.2)
NRBC # BLD: 0 /100 WBCS — SIGNIFICANT CHANGE UP (ref 0–0)
PLATELET # BLD AUTO: 225 K/UL — SIGNIFICANT CHANGE UP (ref 130–400)
POTASSIUM SERPL-MCNC: 3.8 MMOL/L — SIGNIFICANT CHANGE UP (ref 3.5–5)
POTASSIUM SERPL-SCNC: 3.8 MMOL/L — SIGNIFICANT CHANGE UP (ref 3.5–5)
RBC # BLD: 2.88 M/UL — LOW (ref 4.2–5.4)
RBC # FLD: 13.1 % — SIGNIFICANT CHANGE UP (ref 11.5–14.5)
SODIUM SERPL-SCNC: 142 MMOL/L — SIGNIFICANT CHANGE UP (ref 135–146)
SPECIMEN SOURCE: SIGNIFICANT CHANGE UP
SPECIMEN SOURCE: SIGNIFICANT CHANGE UP
WBC # BLD: 9.78 K/UL — SIGNIFICANT CHANGE UP (ref 4.8–10.8)
WBC # FLD AUTO: 9.78 K/UL — SIGNIFICANT CHANGE UP (ref 4.8–10.8)

## 2018-03-31 RX ADMIN — AMIODARONE HYDROCHLORIDE 400 MILLIGRAM(S): 400 TABLET ORAL at 05:05

## 2018-03-31 RX ADMIN — SODIUM CHLORIDE 3 MILLILITER(S): 9 INJECTION INTRAMUSCULAR; INTRAVENOUS; SUBCUTANEOUS at 05:18

## 2018-03-31 RX ADMIN — RIVASTIGMINE 1 PATCH: 4.6 PATCH, EXTENDED RELEASE TRANSDERMAL at 05:04

## 2018-03-31 RX ADMIN — SODIUM CHLORIDE 3 MILLILITER(S): 9 INJECTION INTRAMUSCULAR; INTRAVENOUS; SUBCUTANEOUS at 13:50

## 2018-03-31 RX ADMIN — ENOXAPARIN SODIUM 60 MILLIGRAM(S): 100 INJECTION SUBCUTANEOUS at 05:05

## 2018-03-31 RX ADMIN — DIVALPROEX SODIUM 250 MILLIGRAM(S): 500 TABLET, DELAYED RELEASE ORAL at 05:05

## 2018-03-31 NOTE — PROGRESS NOTE ADULT - PROVIDER SPECIALTY LIST ADULT
Hospitalist
Infectious Disease
Internal Medicine
Orthopedics
Internal Medicine
Hospitalist

## 2018-03-31 NOTE — PROGRESS NOTE ADULT - ASSESSMENT
Afib  - Amiodarone 400 mg po q12h for one week, then 200 mg po q 24h.  -  incentive spirometry for atelectasis  -cont ASA (Chadsvasc 1--->no AC)    Mechanical fall--L hip fx- pod#6 from L suzi  - cont DVT ppx  -  PT  - outpt f/u with ortho   - pain managment     Parkinson's disease  - cont home meds.    Advanced dementia  -Cont Rivastigmine    Dysphagia   -continue ground diet, outpatient re-evaluation    Goals of care discussed, DNR/DNI    Discharge planning >30 mins spent coordinating discharge planning and direct patient encounter

## 2018-03-31 NOTE — PROGRESS NOTE ADULT - SUBJECTIVE AND OBJECTIVE BOX
ELSY ISSA  73y, Female      OVERNIGHT EVENTS:     at bedside. No cough/SOB. No diarrhea.    VITALS:  T(F): 96.6, Max: 100.6 (03-30-18 @ 23:37)  HR: 63  BP: 94/47  RR: 18Vital Signs Last 24 Hrs  T(C): 35.9 (31 Mar 2018 05:35), Max: 38.1 (30 Mar 2018 23:37)  T(F): 96.6 (31 Mar 2018 05:35), Max: 100.6 (30 Mar 2018 23:37)  HR: 63 (31 Mar 2018 05:35) (63 - 86)  BP: 94/47 (31 Mar 2018 05:35) (94/47 - 144/73)  BP(mean): --  RR: 18 (31 Mar 2018 05:35) (18 - 18)  SpO2: 99% (31 Mar 2018 01:20) (99% - 99%)    TESTS & MEASUREMENTS:                        9.0    9.78  )-----------( 225      ( 31 Mar 2018 05:34 )             26.6     03-30    137  |  101  |  12  ----------------------------<  119<H>  3.5   |  25  |  <0.5<L>    Ca    7.3<L>      30 Mar 2018 11:03  Mg     2.0     03-30          Culture - Blood (collected 03-28-18 @ 11:49)  Source: .Blood None  Preliminary Report (03-30-18 @ 01:01):    No growth to date.    Culture - Blood (collected 03-27-18 @ 05:32)  Source: .Blood None  Preliminary Report (03-28-18 @ 15:05):    No growth to date.    Culture - Urine (collected 03-27-18 @ 03:12)  Source: .Urine Catheterized  Final Report (03-28-18 @ 14:04):    <10,000 CFU/ml    Normal Urogenital angie present    Culture - Blood (collected 03-26-18 @ 17:10)  Source: .Blood None  Preliminary Report (03-27-18 @ 23:01):    No growth to date.    Culture - Blood (collected 03-26-18 @ 11:39)  Source: .Blood None  Preliminary Report (03-27-18 @ 16:01):    No growth to date.            RADIOLOGY & ADDITIONAL TESTS:    ANTIBIOTICS:

## 2018-03-31 NOTE — PROGRESS NOTE ADULT - ASSESSMENT
Clinically no evidence of a bacterial PNA.  High probability of aspiration with pneumonitis .  CT with atelectatic changes at bases.  Will not treat as bacterial pneumonia as pts history on presentation also not consistent with a pneumonia.    No yancey catheter. Asymptomatic bacteruria with E. coi  Hold antibiotics for now.   Recall prn please.  Discussed with  at bedside.

## 2018-03-31 NOTE — PROGRESS NOTE ADULT - SUBJECTIVE AND OBJECTIVE BOX
Pt seen and examined at bedside. Spoke to  on phone. No complaints.    T(F): , Max: 100.6 (03-30-18 @ 23:37)  HR: 94 (03-31-18 @ 13:49) (63 - 94)  BP: 110/54 (03-31-18 @ 13:49)  RR: 18 (03-31-18 @ 13:49)  SpO2: 99% (03-31-18 @ 01:20)  IN: 680 mL / OUT: 3 mL / NET: 677 mL      General: No apparent distress, confused  Cardiovascular: S1, S2  Gastrointestinal: Soft, Non-tender, Non-distended  Respiratory: Good air entry bilaterally  Musculoskeletal: Moves all extremities  Lymphatic: No edema  Neurologic: No gross motor deficit, dementia at baseline but follows simple commands  Dermatologic: Skin dry                          9.0    9.78  )-----------( 225      ( 31 Mar 2018 05:34 )             26.6     03-31    142  |  102  |  11  ----------------------------<  115<H>  3.8   |  27  |  <0.5<L>    Ca    7.7<L>      31 Mar 2018 05:34  Mg     2.2     03-31

## 2018-04-01 LAB
CULTURE RESULTS: SIGNIFICANT CHANGE UP
SPECIMEN SOURCE: SIGNIFICANT CHANGE UP

## 2018-04-02 ENCOUNTER — OUTPATIENT (OUTPATIENT)
Dept: OUTPATIENT SERVICES | Facility: HOSPITAL | Age: 73
LOS: 1 days | Discharge: HOME | End: 2018-04-02

## 2018-04-02 DIAGNOSIS — S72.002A FRACTURE OF UNSPECIFIED PART OF NECK OF LEFT FEMUR, INITIAL ENCOUNTER FOR CLOSED FRACTURE: ICD-10-CM

## 2018-04-02 DIAGNOSIS — J69.0 PNEUMONITIS DUE TO INHALATION OF FOOD AND VOMIT: ICD-10-CM

## 2018-04-02 DIAGNOSIS — H40.9 UNSPECIFIED GLAUCOMA: ICD-10-CM

## 2018-04-02 DIAGNOSIS — I48.92 UNSPECIFIED ATRIAL FLUTTER: ICD-10-CM
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
## 2018-04-02 DIAGNOSIS — G20 PARKINSON'S DISEASE: ICD-10-CM

## 2018-04-02 DIAGNOSIS — J95.89 OTHER POSTPROCEDURAL COMPLICATIONS AND DISORDERS OF RESPIRATORY SYSTEM, NOT ELSEWHERE CLASSIFIED: ICD-10-CM

## 2018-04-02 DIAGNOSIS — R82.71 BACTERIURIA: ICD-10-CM

## 2018-04-02 DIAGNOSIS — I10 ESSENTIAL (PRIMARY) HYPERTENSION: ICD-10-CM
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
## 2018-04-02 DIAGNOSIS — R13.10 DYSPHAGIA, UNSPECIFIED: ICD-10-CM

## 2018-04-02 DIAGNOSIS — J98.11 ATELECTASIS: ICD-10-CM

## 2018-04-02 DIAGNOSIS — R65.10 SYSTEMIC INFLAMMATORY RESPONSE SYNDROME (SIRS) OF NON-INFECTIOUS ORIGIN WITHOUT ACUTE ORGAN DYSFUNCTION: ICD-10-CM

## 2018-04-02 DIAGNOSIS — R09.02 HYPOXEMIA: ICD-10-CM

## 2018-04-02 DIAGNOSIS — G30.9 ALZHEIMER'S DISEASE, UNSPECIFIED: ICD-10-CM
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
## 2018-04-02 DIAGNOSIS — I48.0 PAROXYSMAL ATRIAL FIBRILLATION: ICD-10-CM

## 2018-04-02 DIAGNOSIS — D64.9 ANEMIA, UNSPECIFIED: ICD-10-CM

## 2018-04-02 DIAGNOSIS — I95.9 HYPOTENSION, UNSPECIFIED: ICD-10-CM

## 2018-04-02 DIAGNOSIS — Z66 DO NOT RESUSCITATE: ICD-10-CM

## 2018-04-02 DIAGNOSIS — F02.80 DEMENTIA IN OTHER DISEASES CLASSIFIED ELSEWHERE, UNSPECIFIED SEVERITY, WITHOUT BEHAVIORAL DISTURBANCE, PSYCHOTIC DISTURBANCE, MOOD DISTURBANCE, AND ANXIETY: ICD-10-CM

## 2018-04-03 LAB
CULTURE RESULTS: SIGNIFICANT CHANGE UP
SPECIMEN SOURCE: SIGNIFICANT CHANGE UP

## 2023-03-20 NOTE — PACU DISCHARGE NOTE - AIRWAY PATENCY:
Satisfactory [Very Good] : ~his/her~  mood as very good [No] : No [No falls in past year] : Patient reported no falls in the past year [0] : 2) Feeling down, depressed, or hopeless: Not at all (0) [PHQ-2 Negative - No further assessment needed] : PHQ-2 Negative - No further assessment needed [Never] : Never [[id number]] : 0

## 2023-04-21 NOTE — PHYSICAL THERAPY INITIAL EVALUATION ADULT - SITTING BALANCE: DYNAMIC
[FreeTextEntry1] : Ms. Abebe is a 73 year-old female with a history of spinal stenosis.  She was last seen by me a few months ago.  She exhausted PT and epidural steroid injections.  \par \par An old MRI lumbar spine was consistent with severe stenosis L3-5 with mobile spondy\par \par We discussed surgical procedure to address this however she needs an updated MRI.  She will obtain that and follow-up to discuss further intervention. poor balance

## 2024-04-01 NOTE — PHYSICAL THERAPY INITIAL EVALUATION ADULT - LEVEL OF CONSCIOUSNESS, REHAB EVAL
Mobic sent   prn  
Notified via livewell message.  
Patient has has a message in E-Advice as well.   
Patient is calling about her recent visit with her PCP. Patient was diagnosed with tendinitis. Patient is requesting something for the pain when she is at work.    Patient would like nurse to call to discuss.  
confused/lethargic/somnolent

## 2024-07-11 NOTE — DIETITIAN INITIAL EVALUATION ADULT. - MIFFLIN TYPE
Enoxaparin/Lovenox - Compliance/Enoxaparin/Lovenox - Dietary Advice/Enoxaparin/Lovenox - Follow up monitoring/Enoxaparin/Lovenox - Potential for adverse drug reactions and interactions Nneka Ryder (male)